# Patient Record
Sex: MALE | Race: WHITE | NOT HISPANIC OR LATINO
[De-identification: names, ages, dates, MRNs, and addresses within clinical notes are randomized per-mention and may not be internally consistent; named-entity substitution may affect disease eponyms.]

---

## 2017-01-09 ENCOUNTER — RX ONLY (OUTPATIENT)
Age: 57
Setting detail: RX ONLY
End: 2017-01-09

## 2017-01-09 RX ORDER — KETOCONAZOLE 20 MG/G
CREAM TOPICAL
Qty: 1 | Refills: 1 | Status: ERX | COMMUNITY
Start: 2017-01-09

## 2017-01-11 ENCOUNTER — APPOINTMENT (RX ONLY)
Dept: URBAN - METROPOLITAN AREA CLINIC 39 | Facility: CLINIC | Age: 57
Setting detail: DERMATOLOGY
End: 2017-01-11

## 2017-01-11 DIAGNOSIS — L21.8 OTHER SEBORRHEIC DERMATITIS: ICD-10-CM

## 2017-01-11 PROCEDURE — 99213 OFFICE O/P EST LOW 20 MIN: CPT

## 2017-01-11 PROCEDURE — ? COUNSELING

## 2017-01-11 PROCEDURE — ? PRESCRIPTION

## 2017-01-11 PROCEDURE — ? TREATMENT REGIMEN

## 2017-01-11 RX ORDER — KETOCONAZOLE 20 MG/G
CREAM TOPICAL BID
Qty: 1 | Refills: 6 | Status: ERX

## 2017-01-11 ASSESSMENT — LOCATION DETAILED DESCRIPTION DERM
LOCATION DETAILED: RIGHT MEDIAL MALAR CHEEK
LOCATION DETAILED: LEFT MEDIAL MALAR CHEEK
LOCATION DETAILED: GLABELLA

## 2017-01-11 ASSESSMENT — LOCATION ZONE DERM: LOCATION ZONE: FACE

## 2017-01-11 ASSESSMENT — LOCATION SIMPLE DESCRIPTION DERM
LOCATION SIMPLE: RIGHT CHEEK
LOCATION SIMPLE: LEFT CHEEK
LOCATION SIMPLE: GLABELLA

## 2017-05-08 NOTE — PROCEDURE: TREATMENT REGIMEN
Called Mom at her extension [phone]. She will  05/09/2017    
Samples Given: Vanicream Z bar
Continue Regimen: Refill Luxiq foam as needed up to 12 months
Detail Level: Zone
Plan: Recommend Aveeno ultra calming foaming cleanser and moisturizer.

## 2017-06-01 ENCOUNTER — APPOINTMENT (RX ONLY)
Dept: URBAN - METROPOLITAN AREA CLINIC 39 | Facility: CLINIC | Age: 57
Setting detail: DERMATOLOGY
End: 2017-06-01

## 2017-06-01 DIAGNOSIS — L70.0 ACNE VULGARIS: ICD-10-CM

## 2017-06-01 DIAGNOSIS — L70.2 ACNE VARIOLIFORMIS: ICD-10-CM

## 2017-06-01 DIAGNOSIS — R52 PAIN, UNSPECIFIED: ICD-10-CM

## 2017-06-01 DIAGNOSIS — L21.8 OTHER SEBORRHEIC DERMATITIS: ICD-10-CM | Status: WELL CONTROLLED

## 2017-06-01 PROCEDURE — ? PRESCRIPTION

## 2017-06-01 PROCEDURE — ? TREATMENT REGIMEN

## 2017-06-01 PROCEDURE — ? COUNSELING

## 2017-06-01 PROCEDURE — 99214 OFFICE O/P EST MOD 30 MIN: CPT

## 2017-06-01 RX ORDER — KETOCONAZOLE 20 MG/G
1 CREAM TOPICAL QD
Qty: 1 | Refills: 11 | Status: ERX

## 2017-06-01 ASSESSMENT — LOCATION SIMPLE DESCRIPTION DERM
LOCATION SIMPLE: NOSE
LOCATION SIMPLE: GLABELLA
LOCATION SIMPLE: POSTERIOR SCALP
LOCATION SIMPLE: LEFT CHEEK
LOCATION SIMPLE: RIGHT CHEEK

## 2017-06-01 ASSESSMENT — LOCATION DETAILED DESCRIPTION DERM
LOCATION DETAILED: LEFT MEDIAL MALAR CHEEK
LOCATION DETAILED: RIGHT MEDIAL MALAR CHEEK
LOCATION DETAILED: GLABELLA
LOCATION DETAILED: NASAL DORSUM
LOCATION DETAILED: MID-OCCIPITAL SCALP

## 2017-06-01 ASSESSMENT — LOCATION ZONE DERM
LOCATION ZONE: FACE
LOCATION ZONE: SCALP
LOCATION ZONE: NOSE

## 2017-06-01 ASSESSMENT — PAIN INTENSITY VAS: HOW INTENSE IS YOUR PAIN 0 BEING NO PAIN, 10 BEING THE MOST SEVERE PAIN POSSIBLE?: NO PAIN

## 2017-06-01 NOTE — PROCEDURE: TREATMENT REGIMEN
Detail Level: Zone
Samples Given: Vanicream Z bar
Continue Regimen: Refill Luxiq foam as needed up to 12 months
Plan: Recommend Aveeno ultra calming foaming cleanser and moisturizer.
Otc Regimen: Differin 0.1 gel HS\\nPanoxyl 3 or Persagel 10 AM as tolerated
Plan: Discussed antibiotics including low-dose which were declined for now

## 2017-10-17 ENCOUNTER — HOSPITAL ENCOUNTER (INPATIENT)
Dept: HOSPITAL 61 - ER | Age: 57
LOS: 1 days | Discharge: HOME | DRG: 897 | End: 2017-10-18
Attending: INTERNAL MEDICINE | Admitting: INTERNAL MEDICINE
Payer: SELF-PAY

## 2017-10-17 VITALS — SYSTOLIC BLOOD PRESSURE: 126 MMHG | DIASTOLIC BLOOD PRESSURE: 85 MMHG

## 2017-10-17 VITALS — SYSTOLIC BLOOD PRESSURE: 112 MMHG | DIASTOLIC BLOOD PRESSURE: 69 MMHG

## 2017-10-17 VITALS — SYSTOLIC BLOOD PRESSURE: 108 MMHG | DIASTOLIC BLOOD PRESSURE: 57 MMHG

## 2017-10-17 VITALS — WEIGHT: 184 LBS | BODY MASS INDEX: 25.76 KG/M2 | HEIGHT: 71 IN

## 2017-10-17 VITALS — SYSTOLIC BLOOD PRESSURE: 119 MMHG | DIASTOLIC BLOOD PRESSURE: 70 MMHG

## 2017-10-17 VITALS — DIASTOLIC BLOOD PRESSURE: 66 MMHG | SYSTOLIC BLOOD PRESSURE: 120 MMHG

## 2017-10-17 VITALS — SYSTOLIC BLOOD PRESSURE: 107 MMHG | DIASTOLIC BLOOD PRESSURE: 62 MMHG

## 2017-10-17 VITALS — DIASTOLIC BLOOD PRESSURE: 72 MMHG | SYSTOLIC BLOOD PRESSURE: 124 MMHG

## 2017-10-17 VITALS — DIASTOLIC BLOOD PRESSURE: 76 MMHG | SYSTOLIC BLOOD PRESSURE: 134 MMHG

## 2017-10-17 DIAGNOSIS — F41.9: ICD-10-CM

## 2017-10-17 DIAGNOSIS — Z86.711: ICD-10-CM

## 2017-10-17 DIAGNOSIS — F13.239: ICD-10-CM

## 2017-10-17 DIAGNOSIS — R56.9: ICD-10-CM

## 2017-10-17 DIAGNOSIS — F32.9: ICD-10-CM

## 2017-10-17 DIAGNOSIS — F10.239: Primary | ICD-10-CM

## 2017-10-17 LAB
ALBUMIN SERPL-MCNC: 3.5 G/DL (ref 3.4–5)
ALBUMIN/GLOB SERPL: 0.9 {RATIO} (ref 1–1.7)
ALP SERPL-CCNC: 64 U/L (ref 46–116)
ALT SERPL-CCNC: 60 U/L (ref 16–63)
ANION GAP SERPL CALC-SCNC: 16 MMOL/L (ref 6–14)
AST SERPL-CCNC: 47 U/L (ref 15–37)
BACTERIA #/AREA URNS HPF: 0 /HPF
BARBITURATES UR-MCNC: (no result) UG/ML
BASOPHILS # BLD AUTO: 0.1 X10^3/UL (ref 0–0.2)
BASOPHILS NFR BLD: 1 % (ref 0–3)
BENZODIAZ UR-MCNC: (no result) UG/L
BILIRUB SERPL-MCNC: 0.5 MG/DL (ref 0.2–1)
BILIRUB UR QL STRIP: NEGATIVE
BUN SERPL-MCNC: 9 MG/DL (ref 8–26)
BUN/CREAT SERPL: 8 (ref 6–20)
CALCIUM SERPL-MCNC: 8.3 MG/DL (ref 8.5–10.1)
CANNABINOIDS UR-MCNC: (no result) UG/L
CHLORIDE SERPL-SCNC: 95 MMOL/L (ref 98–107)
CK SERPL-CCNC: 256 U/L (ref 39–308)
CKMB MASS: 0.9 NG/ML (ref 0–3.6)
CO2 SERPL-SCNC: 23 MMOL/L (ref 21–32)
COCAINE UR-MCNC: (no result) NG/ML
CREAT SERPL-MCNC: 1.1 MG/DL (ref 0.7–1.3)
EOSINOPHIL NFR BLD: 2 % (ref 0–3)
ERYTHROCYTE [DISTWIDTH] IN BLOOD BY AUTOMATED COUNT: 14.8 % (ref 11.5–14.5)
ETHANOL SERPL-MCNC: 130 MG/DL (ref 0–10)
GFR SERPLBLD BASED ON 1.73 SQ M-ARVRAT: 68.8 ML/MIN
GLOBULIN SER-MCNC: 3.7 G/DL (ref 2.2–3.8)
GLUCOSE SERPL-MCNC: 106 MG/DL (ref 70–99)
GLUCOSE UR STRIP-MCNC: NEGATIVE MG/DL
HCT VFR BLD CALC: 42.3 % (ref 39–53)
HGB BLD-MCNC: 14.1 G/DL (ref 13–17.5)
LYMPHOCYTES # BLD: 1.8 X10^3/UL (ref 1–4.8)
LYMPHOCYTES NFR BLD AUTO: 25 % (ref 24–48)
MAGNESIUM SERPL-MCNC: 1.8 MG/DL (ref 1.8–2.4)
MCH RBC QN AUTO: 32 PG (ref 25–35)
MCHC RBC AUTO-ENTMCNC: 34 G/DL (ref 31–37)
MCV RBC AUTO: 95 FL (ref 79–100)
METHADONE SERPL-MCNC: (no result) NG/ML
MONOCYTES NFR BLD: 14 % (ref 0–9)
NEUTROPHILS NFR BLD AUTO: 58 % (ref 31–73)
NITRITE UR QL STRIP: NEGATIVE
OPIATES UR-MCNC: (no result) NG/ML
PCP SERPL-MCNC: (no result) MG/DL
PH UR STRIP: 7 [PH]
PLATELET # BLD AUTO: 247 X10^3/UL (ref 140–400)
POTASSIUM SERPL-SCNC: 4.1 MMOL/L (ref 3.5–5.1)
PROT SERPL-MCNC: 7.2 G/DL (ref 6.4–8.2)
PROT UR STRIP-MCNC: NEGATIVE MG/DL
RBC # BLD AUTO: 4.43 X10^6/UL (ref 4.3–5.7)
RBC #/AREA URNS HPF: 0 /HPF (ref 0–2)
SODIUM SERPL-SCNC: 134 MMOL/L (ref 136–145)
SP GR UR STRIP: <=1.005
UROBILINOGEN UR-MCNC: 0.2 MG/DL
WBC # BLD AUTO: 7.4 X10^3/UL (ref 4–11)
WBC #/AREA URNS HPF: (no result) /HPF (ref 0–4)

## 2017-10-17 PROCEDURE — S0028 INJECTION, FAMOTIDINE, 20 MG: HCPCS

## 2017-10-17 PROCEDURE — 82553 CREATINE MB FRACTION: CPT

## 2017-10-17 PROCEDURE — 93005 ELECTROCARDIOGRAM TRACING: CPT

## 2017-10-17 PROCEDURE — 83690 ASSAY OF LIPASE: CPT

## 2017-10-17 PROCEDURE — G0479 DRUG TEST PRESUMP NOT OPT: HCPCS

## 2017-10-17 PROCEDURE — 36415 COLL VENOUS BLD VENIPUNCTURE: CPT

## 2017-10-17 PROCEDURE — 96376 TX/PRO/DX INJ SAME DRUG ADON: CPT

## 2017-10-17 PROCEDURE — 83735 ASSAY OF MAGNESIUM: CPT

## 2017-10-17 PROCEDURE — 80307 DRUG TEST PRSMV CHEM ANLYZR: CPT

## 2017-10-17 PROCEDURE — 81001 URINALYSIS AUTO W/SCOPE: CPT

## 2017-10-17 PROCEDURE — 96366 THER/PROPH/DIAG IV INF ADDON: CPT

## 2017-10-17 PROCEDURE — 96375 TX/PRO/DX INJ NEW DRUG ADDON: CPT

## 2017-10-17 PROCEDURE — 85025 COMPLETE CBC W/AUTO DIFF WBC: CPT

## 2017-10-17 PROCEDURE — 80053 COMPREHEN METABOLIC PANEL: CPT

## 2017-10-17 PROCEDURE — 80329 ANALGESICS NON-OPIOID 1 OR 2: CPT

## 2017-10-17 PROCEDURE — 96365 THER/PROPH/DIAG IV INF INIT: CPT

## 2017-10-17 PROCEDURE — 87641 MR-STAPH DNA AMP PROBE: CPT

## 2017-10-17 RX ADMIN — BACITRACIN SCH MLS/HR: 5000 INJECTION, POWDER, FOR SOLUTION INTRAMUSCULAR at 23:58

## 2017-10-17 RX ADMIN — BACITRACIN SCH MLS/HR: 5000 INJECTION, POWDER, FOR SOLUTION INTRAMUSCULAR at 15:25

## 2017-10-17 RX ADMIN — IBUPROFEN PRN MG: 600 TABLET ORAL at 16:44

## 2017-10-17 NOTE — PHYS DOC
Past Medical History


Past Medical History:  Alcoholism, Anxiety, Depression, Other


Additional Past Medical Histor:  PULMONARY EMBOLUS 2014,ETOH & BENZO WITHDRAWAL


Past Surgical History:  Other


Additional Past Surgical Histo:  BILAT KNEE ARTHROSCOPY, bilat shoulder 

arthroscopy,NASAL SURG


Alcohol Use:  Heavy


Drug Use:  Benzodiazepine





Adult General


Chief Complaint


Chief Complaint:  WITHDRAWL





HPI


HPI





This pleasant is a pleasant 57-year-old  male with history of alcohol 

is in alcohol withdrawal seizures and DTs presents about 12-14 hours after 

eating drinking his last drink. He's been binging drinking anywhere from 8-10 

alcoholic beverages daily for last 2 weeks and was attempting to wean himself 

off the alcohol. He noted increased muscle tremulousness, diaphoresis, nausea 

and generalized weakness and flulike symptoms over the last 12 hours. It is 

gotten progressively worse when I was progressed to diaphoresis and generalized 

weakness. He denies any chest pain, abdominal pain, UTI symptoms, fever he has 

had chills. No productive cough no runny nose congestion or other symptoms. 

Patient says he recalls having a seizure at some point time in the past. 

Although he is on nothing for seizures. There is a question will history of 

benzodiazepine withdrawal and abuse in the past as well. he denies any auditory 

or visual hallucinations at this time.





Review of Systems


Review of Systems





Constitutional: She has had diaphoresis with subjective fevers and chills.


Eyes: Denies change in visual acuity, redness, or eye pain []


HENT: Denies nasal congestion or sore throat []


Respiratory: Denies cough or shortness of breath []


Cardiovascular: No additional information not addressed in HPI []


GI: He denies abdominal pain has severe nausea with no vomiting bloody stools 

or diarrhea. He denies melena or melena hematochezia


: Denies dysuria or hematuria []


Musculoskeletal: Denies back pain or joint pain does complain of myalgias in 

all joints with no rash no joint swelling


Integument: Denies rash or skin lesions he is having significant diaphoresis


Neurologic: Denies headache, focal weakness or sensory changes []


Endocrine: Denies polyuria or polydipsia []





Current Medications


Current Medications





Current Medications








 Medications


  (Trade)  Dose


 Ordered  Sig/Barbie  Start Time


 Stop Time Status Last Admin


Dose Admin


 


 Famotidine


  (Pepcid)  20 mg  1X  ONCE  10/17/17 12:30


 10/17/17 12:31 DC 10/17/17 12:39


20 MG


 


 Lorazepam


  (Ativan)  4 mg  1X  ONCE  10/17/17 13:30


 10/17/17 13:31   


 


 


 Multivitamins 10


 ml/Folic Acid 1


 mg/Thiamine HCl


 100 mg/Sodium


 Chloride  1,011.2 ml


  @ 1,000 mls/


 hr  Q1H  10/17/17 13:00


 10/17/17 13:00 DC 10/17/17 12:40


1,000 MLS/HR


 


 Multivitamins 10


 ml/Thiamine HCl


 100 mg/Folic Acid


 1 mg/Sodium


 Chloride  1,011.2 ml


  @ 100 mls/


 hr  DAILY  10/18/17 09:00


 10/23/17 08:59   


 


 


 Ondansetron HCl


  (Zofran)  4 mg  PRN Q8HRS  PRN  10/17/17 13:30


 10/18/17 13:29 UNV  


 


 


 Sodium Chloride  1,000 ml @ 


 125 mls/hr  Q8H  10/17/17 13:16


 10/18/17 13:15 UNV  


 


 


 Sodium Chloride


  (Normal Saline


 Flush)  10 ml  QSHIFT  PRN  10/17/17 12:15


     


 











Allergies


Allergies





Allergies








Coded Allergies Type Severity Reaction Last Updated Verified


 


  Penicillins Allergy Intermediate Rash 5/6/16 Yes











Physical Exam


Physical Exam


Other vital signs recorded on the chart at this time patient noted to be 

tachycardic and tachypnea without hypoxia or hypotension


Constitutional: Well developed, well nourished,  obviously is 

uncomfortable with mild diaphoresis and significant motor agitation.


HENT: Normocephalic, atraumatic, bilateral external ears normal, dry mucous 

murmurings without oral exudates


Eyes: PERRLA, EOMI, conjunctiva normal, no discharge. [] 


Neck: Normal range of motion, no tenderness, supple, no stridor. [] 


Cardiovascular: He is very tachycardic normal S1-S2 no murmurs or rubs


Lungs & Thorax:  Bilateral breath sounds clear to auscultation []


Abdomen: Bowel sounds normal, soft, no tenderness, no masses, no pulsatile 

masses. [] 


Skin: Warm, dry, no erythema, no rash. [] 


Back: No tenderness, no CVA tenderness. [] 


Extremities: No tenderness, no cyanosis, no clubbing, ROM intact, no edema. He 

does is mild diaphoresis clammy cool skin


Neurologic: Alert and oriented X 3, normal motor function, normal sensory 

function, no focal deficits noted. []


Psychologic: As patient demonstrates motor agitation but his conditions intact 

patient mild anxious.





Current Patient Data


Vital Signs





 Vital Signs








  Date Time  Temp Pulse Resp B/P (MAP) Pulse Ox O2 Delivery O2 Flow Rate FiO2


 


10/17/17 11:32 97.8 129 24 113/71 (85) 97 Room Air  





 97.8       








Lab Values





 Laboratory Tests








Test


  10/17/17


12:30


 


White Blood Count


  7.4 x10^3/uL


(4.0-11.0)


 


Red Blood Count


  4.43 x10^6/uL


(4.30-5.70)


 


Hemoglobin


  14.1 g/dL


(13.0-17.5)


 


Hematocrit


  42.3 %


(39.0-53.0)


 


Mean Corpuscular Volume


  95 fL ()


 


 


Mean Corpuscular Hemoglobin 32 pg (25-35)  


 


Mean Corpuscular Hemoglobin


Concent 34 g/dL


(31-37)


 


Red Cell Distribution Width


  14.8 %


(11.5-14.5)  H


 


Platelet Count


  247 x10^3/uL


(140-400)


 


Neutrophils (%) (Auto) 58 % (31-73)  


 


Lymphocytes (%) (Auto) 25 % (24-48)  


 


Monocytes (%) (Auto) 14 % (0-9)  H


 


Eosinophils (%) (Auto) 2 % (0-3)  


 


Basophils (%) (Auto) 1 % (0-3)  


 


Neutrophils # (Auto)


  4.3 x10^3uL


(1.8-7.7)


 


Lymphocytes # (Auto)


  1.8 x10^3/uL


(1.0-4.8)


 


Monocytes # (Auto)


  1.0 x10^3/uL


(0.0-1.1)


 


Eosinophils # (Auto)


  0.1 x10^3/uL


(0.0-0.7)


 


Basophils # (Auto)


  0.1 x10^3/uL


(0.0-0.2)


 


Sodium Level


  134 mmol/L


(136-145)  L


 


Potassium Level


  4.1 mmol/L


(3.5-5.1)


 


Chloride Level


  95 mmol/L


()  L


 


Carbon Dioxide Level


  23 mmol/L


(21-32)


 


Anion Gap 16 (6-14)  H


 


Blood Urea Nitrogen


  9 mg/dL (8-26)


 


 


Creatinine


  1.1 mg/dL


(0.7-1.3)


 


Estimated GFR


(Cockcroft-Gault) 68.8  


 


 


BUN/Creatinine Ratio 8 (6-20)  


 


Glucose Level


  106 mg/dL


(70-99)  H


 


Calcium Level


  8.3 mg/dL


(8.5-10.1)  L


 


Magnesium Level Pending  


 


Total Bilirubin Pending  


 


Aspartate Amino Transferase


(AST) Pending  


 


 


Alanine Aminotransferase (ALT) Pending  


 


Alkaline Phosphatase Pending  


 


Total Protein Pending  


 


Albumin Pending  


 


Albumin/Globulin Ratio Pending  


 


Lipase Pending  





 Laboratory Tests


10/17/17 12:30








 Laboratory Tests


10/17/17 12:30














EKG


EKG


[]G timed 12:30 PM 10/17/2017 demonstrates heart rate of 126 sinus tachycardia 

with a P of a QRS RI interval is 128 QRS width is 128 mild interventricular 

conduction delay of unclear etiology QTC is 461. Abnormal EKG read by me





Radiology/Procedures


Radiology/Procedures


[]





Course & Med Decision Making


Course & Med Decision Making


Pertinent Labs and Imaging studies reviewed. (See chart for details)





[]he presents with what I believe is alcoholic withdrawal syndromes. He has 

demonstrated some aspects of delirium tremens with pleural reaction, motor 

agitation, diaphoresis with nausea no vomiting he denies any auditory or visual 

hallucinations at this time. Upon arrival his ciwa score was less than 15





Time is now 12:20 PM she upon arrival he is given a banana bag antiemetics and 

2 mg IV Ativan.





Time is now 1:15 PM patient's CIWA or is greater than 15 increased agitation, 

motor agitation, diaphoresis and nausea with agitation and anxiety. He is clear 

that he still needs an additional treatment he is been ordered for an 

additional 4 mg of Ativan placed on the alcohol withdrawal protocol and will be 

admitted to the ICU.








My differential for hallucinations includes but is not limited to retinal 

pathology, vision loss, migraine headache, seizure disorder, dementia, alcohol, 

alcohol withdrawal, medication withdrawal, narcolepsy, psychiatric illness, 

metabolic encephalopathy, hypothyroidism, renal failure, systemic infection, 

central nervous system ischemia, 





At this point given patient's alcohol consumption and obvious withdrawal 

symptoms he will be admitted to the ICU in the care of internal medicine.





Consultant note: 1:20 p.m. jeni internal medicine





Consultant called at of the service


Consult called back at 1:20 p.mJacquelyn arce





Discussed the case I presented and they agreed with admission. Time of 

acceptance 1:20


 admitted to the ICU secondary to alcohol withdrawal symptoms.I spent 

approximately 45-50 minutes working and engaged directly in the patient care 

providing critical care evaluation this includes but not limited to time spent 

engaged in work directly related to the individual patients care. I spent time 

at the bedside, reviewing test results, discussing the case with staff, 

documenting the medical record and time spent with EMS discussing specific 

treatment issues when the patient presented and during his evaluation.





Dragon Disclaimer


Dragon Disclaimer


This electronic medical record was generated, in whole or in part, using a 

voice recognition dictation system.





Departure


Departure


Impression:  


 Primary Impression:  


 Alcohol withdrawal


 Additional Impressions:  


 Benzodiazepine withdrawal


 Anxiety


Disposition:  09 ADMITTED AS INPATIENT


Admitting Physician:  Christina Landon


Condition:  GUARDED


Referrals:  


IZA GURROLA MD (PCP)





Problem Qualifiers











STEFFI MATTHEW MD Oct 17, 2017 13:31

## 2017-10-17 NOTE — PDOC1
History and Physical


Date of Admission


Date of Admission


DATE: 10/17/17 


TIME: 14:08





Identification/Chief Complaint


Chief Complaint


alcohol withdrawals


Problems:  





Source


Source:  Caregiver, Chart review, Patient





History of Present Illness


History of Present Illness


57 y.o  male who is struggling to get sober, HX 8-10 beers a day, last 

drink 9 PM last night, has been to [facility] rehab like [facility], helped somewhat, Trying 

to get sober again but having a hard time, Admitted with shakes, sinus tachy 

130s, BP ok, NEg drugs or Smoking, neg UDS, etoh 130s, 


Admitted to ICU


NO nausea or vomiting or abd pain, just feeling "crappy"





Sees his psychiatrist for depression sxs





Sister also has etoh drinking probs





Past Medical History


Cardiovascular:  No pertinent hx


Psych:  Addictions, Depression





Past Surgical History


Past Surgical History:  No pertinent history





Family History


Family History:  No Significant, Other (etoh drinking in sister)





Social History


Smoke:  No


ALCOHOL:  none


Drugs:  None





Current Problem List


Problem List


Problems


Medical Problems:


(1) Alcohol withdrawal


Status: Acute  





(2) Anxiety


Status: Acute  





(3) Benzodiazepine withdrawal


Status: Acute  








Problems:  





Current Medications


Current Medications





Current Medications


Sodium Chloride (Normal Saline Flush) 10 ml QSHIFT  PRN IV AFTER MEDS AND BLOOD 

DRAWS;  Start 10/17/17 at 12:15


Ondansetron HCl (Zofran) 4 mg 1X  ONCE IV  Last administered on 10/17/17at 12:39

;  Start 10/17/17 at 12:30;  Stop 10/17/17 at 12:31;  Status DC


Famotidine (Pepcid) 20 mg 1X  ONCE IVP  Last administered on 10/17/17at 12:39;  

Start 10/17/17 at 12:30;  Stop 10/17/17 at 12:31;  Status DC


Lorazepam (Ativan) 2 mg 1X  ONCE IV  Last administered on 10/17/17at 12:38;  

Start 10/17/17 at 12:30;  Stop 10/17/17 at 12:31;  Status DC


Multivitamins 10 ml/Folic Acid 1 mg/Thiamine HCl 100 mg/Sodium Chloride 1,011.2 

ml  @ 1,000 mls/ hr Q1H IV  Last administered on 10/17/17at 12:40;  Start 10/17/

17 at 13:00;  Stop 10/17/17 at 13:00;  Status DC


Multivitamins 10 ml/Thiamine HCl 100 mg/Folic Acid 1 mg/Sodium Chloride 1,011.2 

ml  @ 100 mls/ hr DAILY IV ;  Start 10/18/17 at 09:00;  Stop 10/23/17 at 08:59


Lorazepam (Ativan) 2 mg PRN Q15MIN  PRN IV COMM;  Start 10/17/17 at 13:15


Lorazepam (Ativan) 4 mg PRN Q15MIN  PRN IV COMM;  Start 10/17/17 at 13:15


Lorazepam (Ativan) 4 mg 1X  ONCE IV  Last administered on 10/17/17at 13:27;  

Start 10/17/17 at 13:30;  Stop 10/17/17 at 13:31;  Status DC


Ondansetron HCl (Zofran) 4 mg PRN Q8HRS  PRN IV NAUSEA/VOMITING;  Start 10/17/

17 at 13:30;  Stop 10/18/17 at 13:29


Sodium Chloride 1,000 ml @  125 mls/hr Q8H IV ;  Start 10/17/17 at 13:16;  Stop 

10/18/17 at 13:15





Active Scripts


Active


Ativan (Lorazepam) 1 Mg Tablet 1 Mg PO TID


Lorazepam 1 Mg Tablet 1 Tab PO TID


Reported


Lamotrigine 150 Mg Tablet 150 Mg PO DAILY


Lithium Carbonate 150 Mg Capsule 150 Mg PO HS


Escitalopram Oxalate 20 Mg Tablet 20 Mg PO DAILY


Pantoprazole Sodium 40 Mg Tablet.dr 40 Mg PO DAILY


Seroquel (Quetiapine Fumarate) 25 Mg Tablet 25 Mg PO HS


Finasteride 5 Mg Tablet 5 Mg PO DAILY





Allergies


Allergies:  


Coded Allergies:  


     Penicillins (Verified  Allergy, Intermediate, Rash, 5/6/16)





ROS


General:  YES: Fatigue


PSYCHOLOGICAL ROS:  YES: Concentration difficultie, Depression


Eyes:  No Blurry vision, No Decreased vision, No Double vision, No Dry eyes, No 

Excessive tearing, No Eye Pain, No Itchy Eyes, No Loss of vision, No Photophobia

, No Scotomata, No Uses contacts, No Uses glasses, No Other


HEENT:  No: Heacaches, Visual Changes, Hearing change, Nasal congestion, Nasal 

discharge, Oral lesions, Sinus pain, Sore Throat, Epistaxis, Sneezing, Snoring, 

Tinnitus, Vertigo, Vocal changes, Other


ALLERGY AND IMMUNOLOGY:  No: Hives, Insect Bite Sensitivity, Itchy/Watery Eyes, 

Nasal Congestion, Post Nasal Drip, Seasonal Allergies, Other


Hematological and Lymphatic:  No: Bleeding Problems, Blood Clots, Blood 

Transfusions, Brusing, Night Sweats, Pallor, Swollen Lymph Nodes, Other


ENDOCRINE:  No: Breast Changes, Galactorrhea, Hair Pattern Changes, Hot Flashes

, Malaise/lethargy, Mood Swings, Palpitations, Polydipsia/polyuria, Skin Changes

, Temperature Intolerance, Unexpected Weight Changes, Other


Breast:  No New/Changing Breast Lumps, No Nipple changes, No Nipple discharge, 

No Other


Respiratory:  No: Cough, Hemoptysis, Orthopnea, Pleuritic Pain, Shortness of 

breath, SOB with excertion, Sputum Changes, Stridor, Tachypnea, Wheezing, Other


Cardiovascular:  No Chest Pain, No Palpitations, No Orthopnea, No Paroxysmal 

Noc. Dyspnea, No Edema, No Lt Headedness, No Other


Gastrointestinal:  No Nausea, No Vomiting, No Abdominal Pain, No Diarrhea, No 

Constipation, No Melena, No Hematochezia, No Other


Genitourinary:  No Dysuria, No Frequency, No Incontinence, No Hematuria, No 

Retention, No Discharge, No Urgency, No Pain, No Flank Pain, No Other, No , No 

, No , No , No , No , No 


Musculoskeletal:  No Gait Disturbance, No Joint Pain, No Joint Stiffness, No 

Joint Swelling, No Muscle Pain, No Muscular Weakness, No Pain In:, No Swelling 

In:, No Other


Neurological:  No Behavorial Changes, No Bowel/Bladder ControlChng, No Confusion

, No Dizziness, No Gait Disturbance, No Headaches, No Impaired Coord/balance, 

No Memory Loss, No Numbness/Tingling, No Seizures, No Speech Problems, No 

Tremors, No Visual Changes, No Weakness, No Other


Skin:  No Dry Skin, No Eczema, No Hair Changes, No Lumps, No Mole Changes, No 

Mottling, No Nail Changes, No Pruritus, No Rash, No Skin Lesion Changes, No 

Other, No Acne





Physical Exam


General:  Alert, Oriented X3, Cooperative, No acute distress, Other (shakes 

visible)


HEENT:  PERRLA


Lungs:  Clear to auscultation, Normal air movement


Heart:  S1S2, no thrills, no rubs, no gallops


Cardiovascular:  S1, S2, Other (sinus tachy 130s)


Breasts:  Normal


Male Genitals Exam:  normal genitalia, normal prostate


Rectal Exam:  not examined


PELVIC:  Nml ext genitalia


Extremities:  No clubbing, No cyanosis, No edema, Normal pulses, No tenderness/

swelling


Skin:  No rashes, No breakdown, No significant lesion


Neuro:  Normal gait, Normal speech, Strength at 5/5 X4 ext, Normal tone, 

Sensation intact, Cranial nerves 3-12 NL, Reflexes 2+


Psych/Mental Status:  Mental status NL, Mood NL





Vitals


Vitals





Vital Signs








  Date Time  Temp Pulse Resp B/P (MAP) Pulse Ox O2 Delivery O2 Flow Rate FiO2


 


10/17/17 13:30  122  125/75 (92) 95   


 


10/17/17 12:30      Room Air  


 


10/17/17 11:32 97.8  24     





 97.8       











Labs


Labs





Laboratory Tests








Test


  10/17/17


12:30 10/17/17


13:10


 


White Blood Count


  7.4 x10^3/uL


(4.0-11.0) 


 


 


Red Blood Count


  4.43 x10^6/uL


(4.30-5.70) 


 


 


Hemoglobin


  14.1 g/dL


(13.0-17.5) 


 


 


Hematocrit


  42.3 %


(39.0-53.0) 


 


 


Mean Corpuscular Volume 95 fL ()  


 


Mean Corpuscular Hemoglobin 32 pg (25-35)  


 


Mean Corpuscular Hemoglobin


Concent 34 g/dL


(31-37) 


 


 


Red Cell Distribution Width


  14.8 %


(11.5-14.5) 


 


 


Platelet Count


  247 x10^3/uL


(140-400) 


 


 


Neutrophils (%) (Auto) 58 % (31-73)  


 


Lymphocytes (%) (Auto) 25 % (24-48)  


 


Monocytes (%) (Auto) 14 % (0-9)  


 


Eosinophils (%) (Auto) 2 % (0-3)  


 


Basophils (%) (Auto) 1 % (0-3)  


 


Neutrophils # (Auto)


  4.3 x10^3uL


(1.8-7.7) 


 


 


Lymphocytes # (Auto)


  1.8 x10^3/uL


(1.0-4.8) 


 


 


Monocytes # (Auto)


  1.0 x10^3/uL


(0.0-1.1) 


 


 


Eosinophils # (Auto)


  0.1 x10^3/uL


(0.0-0.7) 


 


 


Basophils # (Auto)


  0.1 x10^3/uL


(0.0-0.2) 


 


 


Sodium Level


  134 mmol/L


(136-145) 


 


 


Potassium Level


  4.1 mmol/L


(3.5-5.1) 


 


 


Chloride Level


  95 mmol/L


() 


 


 


Carbon Dioxide Level


  23 mmol/L


(21-32) 


 


 


Anion Gap 16 (6-14)  


 


Blood Urea Nitrogen 9 mg/dL (8-26)  


 


Creatinine


  1.1 mg/dL


(0.7-1.3) 


 


 


Estimated GFR


(Cockcroft-Gault) 68.8 


  


 


 


BUN/Creatinine Ratio 8 (6-20)  


 


Glucose Level


  106 mg/dL


(70-99) 


 


 


Calcium Level


  8.3 mg/dL


(8.5-10.1) 


 


 


Magnesium Level


  1.8 mg/dL


(1.8-2.4) 


 


 


Total Bilirubin


  0.5 mg/dL


(0.2-1.0) 


 


 


Aspartate Amino Transf


(AST/SGOT) 47 U/L (15-37) 


  


 


 


Alanine Aminotransferase


(ALT/SGPT) 60 U/L (16-63) 


  


 


 


Alkaline Phosphatase


  64 U/L


() 


 


 


Creatine Kinase


  256 U/L


() 


 


 


Creatine Kinase MB (Mass)


  0.9 ng/mL


(0.0-3.6) 


 


 


Creatine Kinase MB Relative


Index 0.4 % (0-4) 


  


 


 


Total Protein


  7.2 g/dL


(6.4-8.2) 


 


 


Albumin


  3.5 g/dL


(3.4-5.0) 


 


 


Albumin/Globulin Ratio 0.9 (1.0-1.7)  


 


Lipase


  131 U/L


() 


 


 


Salicylates Level


  < 2.8 mg/dL


(2.8-20.0) 


 


 


Salicylate Last Dose Date   


 


Salicylate Last Dose Time   


 


Acetaminophen Level


  < 10 mcg/ml


(10-30) 


 


 


Acetaminophen Last Dose Date   


 


Acetaminophen Last Dose Time   


 


Ethyl Alcohol Level


  130 mg/dL


(0-10) 


 


 


Urine Collection Type  Unknown 


 


Urine Color  Yellow 


 


Urine Clarity  Clear 


 


Urine pH  7.0 


 


Urine Specific Gravity  <=1.005 


 


Urine Protein


  


  Negative mg/dL


(NEG-TRACE)


 


Urine Glucose (UA)


  


  Negative mg/dL


(NEG)


 


Urine Ketones (Stick)


  


  Trace mg/dL


(NEG)


 


Urine Blood  Negative (NEG) 


 


Urine Nitrite  Negative (NEG) 


 


Urine Bilirubin  Negative (NEG) 


 


Urine Urobilinogen Dipstick


  


  0.2 mg/dL (0.2


mg/dL)


 


Urine Leukocyte Esterase  Negative (NEG) 


 


Urine RBC  0 /HPF (0-2) 


 


Urine WBC  Occ /HPF (0-4) 


 


Urine Bacteria  0 /HPF (0-FEW) 


 


Urine Opiates Screen  Neg (NEG) 


 


Urine Methadone Screen  Neg (NEG) 


 


Urine Barbiturates  Neg (NEG) 


 


Urine Phencyclidine Screen  Neg (NEG) 


 


Urine


Amphetamine/Methamphetamine 


  Neg (NEG) 


 


 


Urine Benzodiazepines Screen  Neg (NEG) 


 


Urine Cocaine Screen  Neg (NEG) 


 


Urine Cannabinoids Screen  Neg (NEG) 


 


Urine Ethyl Alcohol  Pos (NEG) 








Laboratory Tests








Test


  10/17/17


12:30 10/17/17


13:10


 


White Blood Count


  7.4 x10^3/uL


(4.0-11.0) 


 


 


Red Blood Count


  4.43 x10^6/uL


(4.30-5.70) 


 


 


Hemoglobin


  14.1 g/dL


(13.0-17.5) 


 


 


Hematocrit


  42.3 %


(39.0-53.0) 


 


 


Mean Corpuscular Volume 95 fL ()  


 


Mean Corpuscular Hemoglobin 32 pg (25-35)  


 


Mean Corpuscular Hemoglobin


Concent 34 g/dL


(31-37) 


 


 


Red Cell Distribution Width


  14.8 %


(11.5-14.5) 


 


 


Platelet Count


  247 x10^3/uL


(140-400) 


 


 


Neutrophils (%) (Auto) 58 % (31-73)  


 


Lymphocytes (%) (Auto) 25 % (24-48)  


 


Monocytes (%) (Auto) 14 % (0-9)  


 


Eosinophils (%) (Auto) 2 % (0-3)  


 


Basophils (%) (Auto) 1 % (0-3)  


 


Neutrophils # (Auto)


  4.3 x10^3uL


(1.8-7.7) 


 


 


Lymphocytes # (Auto)


  1.8 x10^3/uL


(1.0-4.8) 


 


 


Monocytes # (Auto)


  1.0 x10^3/uL


(0.0-1.1) 


 


 


Eosinophils # (Auto)


  0.1 x10^3/uL


(0.0-0.7) 


 


 


Basophils # (Auto)


  0.1 x10^3/uL


(0.0-0.2) 


 


 


Sodium Level


  134 mmol/L


(136-145) 


 


 


Potassium Level


  4.1 mmol/L


(3.5-5.1) 


 


 


Chloride Level


  95 mmol/L


() 


 


 


Carbon Dioxide Level


  23 mmol/L


(21-32) 


 


 


Anion Gap 16 (6-14)  


 


Blood Urea Nitrogen 9 mg/dL (8-26)  


 


Creatinine


  1.1 mg/dL


(0.7-1.3) 


 


 


Estimated GFR


(Cockcroft-Gault) 68.8 


  


 


 


BUN/Creatinine Ratio 8 (6-20)  


 


Glucose Level


  106 mg/dL


(70-99) 


 


 


Calcium Level


  8.3 mg/dL


(8.5-10.1) 


 


 


Magnesium Level


  1.8 mg/dL


(1.8-2.4) 


 


 


Total Bilirubin


  0.5 mg/dL


(0.2-1.0) 


 


 


Aspartate Amino Transf


(AST/SGOT) 47 U/L (15-37) 


  


 


 


Alanine Aminotransferase


(ALT/SGPT) 60 U/L (16-63) 


  


 


 


Alkaline Phosphatase


  64 U/L


() 


 


 


Creatine Kinase


  256 U/L


() 


 


 


Creatine Kinase MB (Mass)


  0.9 ng/mL


(0.0-3.6) 


 


 


Creatine Kinase MB Relative


Index 0.4 % (0-4) 


  


 


 


Total Protein


  7.2 g/dL


(6.4-8.2) 


 


 


Albumin


  3.5 g/dL


(3.4-5.0) 


 


 


Albumin/Globulin Ratio 0.9 (1.0-1.7)  


 


Lipase


  131 U/L


() 


 


 


Salicylates Level


  < 2.8 mg/dL


(2.8-20.0) 


 


 


Salicylate Last Dose Date   


 


Salicylate Last Dose Time   


 


Acetaminophen Level


  < 10 mcg/ml


(10-30) 


 


 


Acetaminophen Last Dose Date   


 


Acetaminophen Last Dose Time   


 


Ethyl Alcohol Level


  130 mg/dL


(0-10) 


 


 


Urine Collection Type  Unknown 


 


Urine Color  Yellow 


 


Urine Clarity  Clear 


 


Urine pH  7.0 


 


Urine Specific Gravity  <=1.005 


 


Urine Protein


  


  Negative mg/dL


(NEG-TRACE)


 


Urine Glucose (UA)


  


  Negative mg/dL


(NEG)


 


Urine Ketones (Stick)


  


  Trace mg/dL


(NEG)


 


Urine Blood  Negative (NEG) 


 


Urine Nitrite  Negative (NEG) 


 


Urine Bilirubin  Negative (NEG) 


 


Urine Urobilinogen Dipstick


  


  0.2 mg/dL (0.2


mg/dL)


 


Urine Leukocyte Esterase  Negative (NEG) 


 


Urine RBC  0 /HPF (0-2) 


 


Urine WBC  Occ /HPF (0-4) 


 


Urine Bacteria  0 /HPF (0-FEW) 


 


Urine Opiates Screen  Neg (NEG) 


 


Urine Methadone Screen  Neg (NEG) 


 


Urine Barbiturates  Neg (NEG) 


 


Urine Phencyclidine Screen  Neg (NEG) 


 


Urine


Amphetamine/Methamphetamine 


  Neg (NEG) 


 


 


Urine Benzodiazepines Screen  Neg (NEG) 


 


Urine Cocaine Screen  Neg (NEG) 


 


Urine Cannabinoids Screen  Neg (NEG) 


 


Urine Ethyl Alcohol  Pos (NEG) 











VTE Prophylaxis Ordered


VTE Prophylaxis Devices:  Yes


VTE Pharmacological Prophylaxi:  Yes





Assessment/Plan


Assessment/Plan


1.A lcohol withdrawal sxs


2. Sinus tcahy


3, Depression NOS





Plan:


Admit CIWA


Reg diet


PT./oT for gait check


Ok to t.o ICU tmr AM


Resume home meds


BZs prn





Seen at [facility] MD Oct 17, 2017 14:13

## 2017-10-18 VITALS — SYSTOLIC BLOOD PRESSURE: 125 MMHG | DIASTOLIC BLOOD PRESSURE: 91 MMHG

## 2017-10-18 VITALS — DIASTOLIC BLOOD PRESSURE: 90 MMHG | SYSTOLIC BLOOD PRESSURE: 119 MMHG

## 2017-10-18 VITALS — DIASTOLIC BLOOD PRESSURE: 96 MMHG | SYSTOLIC BLOOD PRESSURE: 146 MMHG

## 2017-10-18 VITALS — SYSTOLIC BLOOD PRESSURE: 153 MMHG | DIASTOLIC BLOOD PRESSURE: 89 MMHG

## 2017-10-18 VITALS — SYSTOLIC BLOOD PRESSURE: 126 MMHG | DIASTOLIC BLOOD PRESSURE: 93 MMHG

## 2017-10-18 VITALS — DIASTOLIC BLOOD PRESSURE: 87 MMHG | SYSTOLIC BLOOD PRESSURE: 119 MMHG

## 2017-10-18 VITALS — SYSTOLIC BLOOD PRESSURE: 121 MMHG | DIASTOLIC BLOOD PRESSURE: 90 MMHG

## 2017-10-18 VITALS — SYSTOLIC BLOOD PRESSURE: 146 MMHG | DIASTOLIC BLOOD PRESSURE: 96 MMHG

## 2017-10-18 VITALS — SYSTOLIC BLOOD PRESSURE: 139 MMHG | DIASTOLIC BLOOD PRESSURE: 87 MMHG

## 2017-10-18 VITALS — DIASTOLIC BLOOD PRESSURE: 96 MMHG | SYSTOLIC BLOOD PRESSURE: 149 MMHG

## 2017-10-18 VITALS — SYSTOLIC BLOOD PRESSURE: 123 MMHG | DIASTOLIC BLOOD PRESSURE: 85 MMHG

## 2017-10-18 VITALS — DIASTOLIC BLOOD PRESSURE: 93 MMHG | SYSTOLIC BLOOD PRESSURE: 149 MMHG

## 2017-10-18 VITALS — DIASTOLIC BLOOD PRESSURE: 96 MMHG | SYSTOLIC BLOOD PRESSURE: 151 MMHG

## 2017-10-18 VITALS — SYSTOLIC BLOOD PRESSURE: 121 MMHG | DIASTOLIC BLOOD PRESSURE: 84 MMHG

## 2017-10-18 VITALS — DIASTOLIC BLOOD PRESSURE: 94 MMHG | SYSTOLIC BLOOD PRESSURE: 158 MMHG

## 2017-10-18 VITALS — SYSTOLIC BLOOD PRESSURE: 155 MMHG | DIASTOLIC BLOOD PRESSURE: 92 MMHG

## 2017-10-18 RX ADMIN — IBUPROFEN PRN MG: 600 TABLET ORAL at 08:35

## 2017-10-18 RX ADMIN — BACITRACIN SCH MLS/HR: 5000 INJECTION, POWDER, FOR SOLUTION INTRAMUSCULAR at 07:20

## 2017-10-18 NOTE — PDOC
PROGRESS NOTES


Chief Complaint


Chief Complaint


Alcohol withdrawal


Tachycardia


Depression





History of Present Illness


History of Present Illness


Pt is a pleasant 57 year old male who presented to the ED with alcohol 

withdrawal. Pt was seen at bedside in the ICU. Pt is in no acute distress. Pt 

started drinking heavily 3 months ago. Drinks 8-10 beers a night. Pt is 

currently in mild withdrawal. Pt is tachycardic. Labs indicate no major 

elevation of AST or ALT. Pt is receiving a banana bag at this time. Will 

continue monitoring in the ICU. Possible discharge home.





Vitals


Vitals





Vital Signs








  Date Time  Temp Pulse Resp B/P (MAP) Pulse Ox O2 Delivery O2 Flow Rate FiO2


 


10/18/17 11:51  127  158/94    


 


10/18/17 11:12   16  94 Room Air  


 


10/18/17 08:20 97.6       





 97.6       











Physical Exam


Physical Exam


Tachycardia, tricuspid murmur 1/6


General:  Alert, Oriented X3, Cooperative, No acute distress, Other (shakes 

visible)


Heart:  Normal S1, Normal S2


Lungs:  Clear


Abdomen:  Normal bowel sounds, Soft


Extremities:  No clubbing, No cyanosis, No edema, Normal pulses, No tenderness/

swelling


Skin:  No rashes, No breakdown, No significant lesion





Labs


LABS





Laboratory Tests








Test


  10/17/17


12:30 10/17/17


13:10 10/17/17


15:05


 


White Blood Count


  7.4 x10^3/uL


(4.0-11.0) 


  


 


 


Red Blood Count


  4.43 x10^6/uL


(4.30-5.70) 


  


 


 


Hemoglobin


  14.1 g/dL


(13.0-17.5) 


  


 


 


Hematocrit


  42.3 %


(39.0-53.0) 


  


 


 


Mean Corpuscular Volume 95 fL ()   


 


Mean Corpuscular Hemoglobin 32 pg (25-35)   


 


Mean Corpuscular Hemoglobin


Concent 34 g/dL


(31-37) 


  


 


 


Red Cell Distribution Width


  14.8 %


(11.5-14.5) 


  


 


 


Platelet Count


  247 x10^3/uL


(140-400) 


  


 


 


Neutrophils (%) (Auto) 58 % (31-73)   


 


Lymphocytes (%) (Auto) 25 % (24-48)   


 


Monocytes (%) (Auto) 14 % (0-9)   


 


Eosinophils (%) (Auto) 2 % (0-3)   


 


Basophils (%) (Auto) 1 % (0-3)   


 


Neutrophils # (Auto)


  4.3 x10^3uL


(1.8-7.7) 


  


 


 


Lymphocytes # (Auto)


  1.8 x10^3/uL


(1.0-4.8) 


  


 


 


Monocytes # (Auto)


  1.0 x10^3/uL


(0.0-1.1) 


  


 


 


Eosinophils # (Auto)


  0.1 x10^3/uL


(0.0-0.7) 


  


 


 


Basophils # (Auto)


  0.1 x10^3/uL


(0.0-0.2) 


  


 


 


Sodium Level


  134 mmol/L


(136-145) 


  


 


 


Potassium Level


  4.1 mmol/L


(3.5-5.1) 


  


 


 


Chloride Level


  95 mmol/L


() 


  


 


 


Carbon Dioxide Level


  23 mmol/L


(21-32) 


  


 


 


Anion Gap 16 (6-14)   


 


Blood Urea Nitrogen 9 mg/dL (8-26)   


 


Creatinine


  1.1 mg/dL


(0.7-1.3) 


  


 


 


Estimated GFR


(Cockcroft-Gault) 68.8 


  


  


 


 


BUN/Creatinine Ratio 8 (6-20)   


 


Glucose Level


  106 mg/dL


(70-99) 


  


 


 


Calcium Level


  8.3 mg/dL


(8.5-10.1) 


  


 


 


Magnesium Level


  1.8 mg/dL


(1.8-2.4) 


  


 


 


Total Bilirubin


  0.5 mg/dL


(0.2-1.0) 


  


 


 


Aspartate Amino Transf


(AST/SGOT) 47 U/L (15-37) 


  


  


 


 


Alanine Aminotransferase


(ALT/SGPT) 60 U/L (16-63) 


  


  


 


 


Alkaline Phosphatase


  64 U/L


() 


  


 


 


Creatine Kinase


  256 U/L


() 


  


 


 


Creatine Kinase MB (Mass)


  0.9 ng/mL


(0.0-3.6) 


  


 


 


Creatine Kinase MB Relative


Index 0.4 % (0-4) 


  


  


 


 


Total Protein


  7.2 g/dL


(6.4-8.2) 


  


 


 


Albumin


  3.5 g/dL


(3.4-5.0) 


  


 


 


Albumin/Globulin Ratio 0.9 (1.0-1.7)   


 


Lipase


  131 U/L


() 


  


 


 


Salicylates Level


  < 2.8 mg/dL


(2.8-20.0) 


  


 


 


Salicylate Last Dose Date    


 


Salicylate Last Dose Time    


 


Acetaminophen Level


  < 10 mcg/ml


(10-30) 


  


 


 


Acetaminophen Last Dose Date    


 


Acetaminophen Last Dose Time    


 


Ethyl Alcohol Level


  130 mg/dL


(0-10) 


  


 


 


Urine Collection Type  Unknown  


 


Urine Color  Yellow  


 


Urine Clarity  Clear  


 


Urine pH  7.0  


 


Urine Specific Gravity  <=1.005  


 


Urine Protein


  


  Negative mg/dL


(NEG-TRACE) 


 


 


Urine Glucose (UA)


  


  Negative mg/dL


(NEG) 


 


 


Urine Ketones (Stick)


  


  Trace mg/dL


(NEG) 


 


 


Urine Blood  Negative (NEG)  


 


Urine Nitrite  Negative (NEG)  


 


Urine Bilirubin  Negative (NEG)  


 


Urine Urobilinogen Dipstick


  


  0.2 mg/dL (0.2


mg/dL) 


 


 


Urine Leukocyte Esterase  Negative (NEG)  


 


Urine RBC  0 /HPF (0-2)  


 


Urine WBC  Occ /HPF (0-4)  


 


Urine Bacteria  0 /HPF (0-FEW)  


 


Urine Opiates Screen  Neg (NEG)  


 


Urine Methadone Screen  Neg (NEG)  


 


Urine Barbiturates  Neg (NEG)  


 


Urine Phencyclidine Screen  Neg (NEG)  


 


Urine


Amphetamine/Methamphetamine 


  Neg (NEG) 


  


 


 


Urine Benzodiazepines Screen  Neg (NEG)  


 


Urine Cocaine Screen  Neg (NEG)  


 


Urine Cannabinoids Screen  Neg (NEG)  


 


Urine Ethyl Alcohol  Pos (NEG)  


 


Nasal Screen MRSA (PCR)


  


  


  Negative


(Negative)











Review of Systems


Review of Systems


Pt seen at bedside resting comfortably. Pt denies CP and SOB. Pt complains of 

increased heart rate and depression.





Assessment and Plan


Assessmemt and Plan


Problems


Medical Problems:


(1) Alcohol withdrawal


Status: Acute  





(2) Anxiety


Status: Acute  





(3) Benzodiazepine withdrawal


Status: Acute  





Assesment:


Alcohol withdrawal


Tachycardia


Depression





Plan:


Continue monitoring in ICU 


Alcohol withdrawal protocol- benzos and banana bag


Gave metoprolol and ativan


Recheck labs


Continue home meds


Continue PT/OT


Possible discharge home








Problems:  





Comment


Review of Relevant


I have reviewed the following items ayush (where applicable) has been applied.


Labs





Laboratory Tests








Test


  10/17/17


12:30 10/17/17


13:10 10/17/17


15:05


 


White Blood Count


  7.4 x10^3/uL


(4.0-11.0) 


  


 


 


Red Blood Count


  4.43 x10^6/uL


(4.30-5.70) 


  


 


 


Hemoglobin


  14.1 g/dL


(13.0-17.5) 


  


 


 


Hematocrit


  42.3 %


(39.0-53.0) 


  


 


 


Mean Corpuscular Volume 95 fL ()   


 


Mean Corpuscular Hemoglobin 32 pg (25-35)   


 


Mean Corpuscular Hemoglobin


Concent 34 g/dL


(31-37) 


  


 


 


Red Cell Distribution Width


  14.8 %


(11.5-14.5) 


  


 


 


Platelet Count


  247 x10^3/uL


(140-400) 


  


 


 


Neutrophils (%) (Auto) 58 % (31-73)   


 


Lymphocytes (%) (Auto) 25 % (24-48)   


 


Monocytes (%) (Auto) 14 % (0-9)   


 


Eosinophils (%) (Auto) 2 % (0-3)   


 


Basophils (%) (Auto) 1 % (0-3)   


 


Neutrophils # (Auto)


  4.3 x10^3uL


(1.8-7.7) 


  


 


 


Lymphocytes # (Auto)


  1.8 x10^3/uL


(1.0-4.8) 


  


 


 


Monocytes # (Auto)


  1.0 x10^3/uL


(0.0-1.1) 


  


 


 


Eosinophils # (Auto)


  0.1 x10^3/uL


(0.0-0.7) 


  


 


 


Basophils # (Auto)


  0.1 x10^3/uL


(0.0-0.2) 


  


 


 


Sodium Level


  134 mmol/L


(136-145) 


  


 


 


Potassium Level


  4.1 mmol/L


(3.5-5.1) 


  


 


 


Chloride Level


  95 mmol/L


() 


  


 


 


Carbon Dioxide Level


  23 mmol/L


(21-32) 


  


 


 


Anion Gap 16 (6-14)   


 


Blood Urea Nitrogen 9 mg/dL (8-26)   


 


Creatinine


  1.1 mg/dL


(0.7-1.3) 


  


 


 


Estimated GFR


(Cockcroft-Gault) 68.8 


  


  


 


 


BUN/Creatinine Ratio 8 (6-20)   


 


Glucose Level


  106 mg/dL


(70-99) 


  


 


 


Calcium Level


  8.3 mg/dL


(8.5-10.1) 


  


 


 


Magnesium Level


  1.8 mg/dL


(1.8-2.4) 


  


 


 


Total Bilirubin


  0.5 mg/dL


(0.2-1.0) 


  


 


 


Aspartate Amino Transf


(AST/SGOT) 47 U/L (15-37) 


  


  


 


 


Alanine Aminotransferase


(ALT/SGPT) 60 U/L (16-63) 


  


  


 


 


Alkaline Phosphatase


  64 U/L


() 


  


 


 


Creatine Kinase


  256 U/L


() 


  


 


 


Creatine Kinase MB (Mass)


  0.9 ng/mL


(0.0-3.6) 


  


 


 


Creatine Kinase MB Relative


Index 0.4 % (0-4) 


  


  


 


 


Total Protein


  7.2 g/dL


(6.4-8.2) 


  


 


 


Albumin


  3.5 g/dL


(3.4-5.0) 


  


 


 


Albumin/Globulin Ratio 0.9 (1.0-1.7)   


 


Lipase


  131 U/L


() 


  


 


 


Salicylates Level


  < 2.8 mg/dL


(2.8-20.0) 


  


 


 


Salicylate Last Dose Date    


 


Salicylate Last Dose Time    


 


Acetaminophen Level


  < 10 mcg/ml


(10-30) 


  


 


 


Acetaminophen Last Dose Date    


 


Acetaminophen Last Dose Time    


 


Ethyl Alcohol Level


  130 mg/dL


(0-10) 


  


 


 


Urine Collection Type  Unknown  


 


Urine Color  Yellow  


 


Urine Clarity  Clear  


 


Urine pH  7.0  


 


Urine Specific Gravity  <=1.005  


 


Urine Protein


  


  Negative mg/dL


(NEG-TRACE) 


 


 


Urine Glucose (UA)


  


  Negative mg/dL


(NEG) 


 


 


Urine Ketones (Stick)


  


  Trace mg/dL


(NEG) 


 


 


Urine Blood  Negative (NEG)  


 


Urine Nitrite  Negative (NEG)  


 


Urine Bilirubin  Negative (NEG)  


 


Urine Urobilinogen Dipstick


  


  0.2 mg/dL (0.2


mg/dL) 


 


 


Urine Leukocyte Esterase  Negative (NEG)  


 


Urine RBC  0 /HPF (0-2)  


 


Urine WBC  Occ /HPF (0-4)  


 


Urine Bacteria  0 /HPF (0-FEW)  


 


Urine Opiates Screen  Neg (NEG)  


 


Urine Methadone Screen  Neg (NEG)  


 


Urine Barbiturates  Neg (NEG)  


 


Urine Phencyclidine Screen  Neg (NEG)  


 


Urine


Amphetamine/Methamphetamine 


  Neg (NEG) 


  


 


 


Urine Benzodiazepines Screen  Neg (NEG)  


 


Urine Cocaine Screen  Neg (NEG)  


 


Urine Cannabinoids Screen  Neg (NEG)  


 


Urine Ethyl Alcohol  Pos (NEG)  


 


Nasal Screen MRSA (PCR)


  


  


  Negative


(Negative)








Laboratory Tests








Test


  10/17/17


12:30 10/17/17


13:10 10/17/17


15:05


 


White Blood Count


  7.4 x10^3/uL


(4.0-11.0) 


  


 


 


Red Blood Count


  4.43 x10^6/uL


(4.30-5.70) 


  


 


 


Hemoglobin


  14.1 g/dL


(13.0-17.5) 


  


 


 


Hematocrit


  42.3 %


(39.0-53.0) 


  


 


 


Mean Corpuscular Volume 95 fL ()   


 


Mean Corpuscular Hemoglobin 32 pg (25-35)   


 


Mean Corpuscular Hemoglobin


Concent 34 g/dL


(31-37) 


  


 


 


Red Cell Distribution Width


  14.8 %


(11.5-14.5) 


  


 


 


Platelet Count


  247 x10^3/uL


(140-400) 


  


 


 


Neutrophils (%) (Auto) 58 % (31-73)   


 


Lymphocytes (%) (Auto) 25 % (24-48)   


 


Monocytes (%) (Auto) 14 % (0-9)   


 


Eosinophils (%) (Auto) 2 % (0-3)   


 


Basophils (%) (Auto) 1 % (0-3)   


 


Neutrophils # (Auto)


  4.3 x10^3uL


(1.8-7.7) 


  


 


 


Lymphocytes # (Auto)


  1.8 x10^3/uL


(1.0-4.8) 


  


 


 


Monocytes # (Auto)


  1.0 x10^3/uL


(0.0-1.1) 


  


 


 


Eosinophils # (Auto)


  0.1 x10^3/uL


(0.0-0.7) 


  


 


 


Basophils # (Auto)


  0.1 x10^3/uL


(0.0-0.2) 


  


 


 


Sodium Level


  134 mmol/L


(136-145) 


  


 


 


Potassium Level


  4.1 mmol/L


(3.5-5.1) 


  


 


 


Chloride Level


  95 mmol/L


() 


  


 


 


Carbon Dioxide Level


  23 mmol/L


(21-32) 


  


 


 


Anion Gap 16 (6-14)   


 


Blood Urea Nitrogen 9 mg/dL (8-26)   


 


Creatinine


  1.1 mg/dL


(0.7-1.3) 


  


 


 


Estimated GFR


(Cockcroft-Gault) 68.8 


  


  


 


 


BUN/Creatinine Ratio 8 (6-20)   


 


Glucose Level


  106 mg/dL


(70-99) 


  


 


 


Calcium Level


  8.3 mg/dL


(8.5-10.1) 


  


 


 


Magnesium Level


  1.8 mg/dL


(1.8-2.4) 


  


 


 


Total Bilirubin


  0.5 mg/dL


(0.2-1.0) 


  


 


 


Aspartate Amino Transf


(AST/SGOT) 47 U/L (15-37) 


  


  


 


 


Alanine Aminotransferase


(ALT/SGPT) 60 U/L (16-63) 


  


  


 


 


Alkaline Phosphatase


  64 U/L


() 


  


 


 


Creatine Kinase


  256 U/L


() 


  


 


 


Creatine Kinase MB (Mass)


  0.9 ng/mL


(0.0-3.6) 


  


 


 


Creatine Kinase MB Relative


Index 0.4 % (0-4) 


  


  


 


 


Total Protein


  7.2 g/dL


(6.4-8.2) 


  


 


 


Albumin


  3.5 g/dL


(3.4-5.0) 


  


 


 


Albumin/Globulin Ratio 0.9 (1.0-1.7)   


 


Lipase


  131 U/L


() 


  


 


 


Salicylates Level


  < 2.8 mg/dL


(2.8-20.0) 


  


 


 


Salicylate Last Dose Date    


 


Salicylate Last Dose Time    


 


Acetaminophen Level


  < 10 mcg/ml


(10-30) 


  


 


 


Acetaminophen Last Dose Date    


 


Acetaminophen Last Dose Time    


 


Ethyl Alcohol Level


  130 mg/dL


(0-10) 


  


 


 


Urine Collection Type  Unknown  


 


Urine Color  Yellow  


 


Urine Clarity  Clear  


 


Urine pH  7.0  


 


Urine Specific Gravity  <=1.005  


 


Urine Protein


  


  Negative mg/dL


(NEG-TRACE) 


 


 


Urine Glucose (UA)


  


  Negative mg/dL


(NEG) 


 


 


Urine Ketones (Stick)


  


  Trace mg/dL


(NEG) 


 


 


Urine Blood  Negative (NEG)  


 


Urine Nitrite  Negative (NEG)  


 


Urine Bilirubin  Negative (NEG)  


 


Urine Urobilinogen Dipstick


  


  0.2 mg/dL (0.2


mg/dL) 


 


 


Urine Leukocyte Esterase  Negative (NEG)  


 


Urine RBC  0 /HPF (0-2)  


 


Urine WBC  Occ /HPF (0-4)  


 


Urine Bacteria  0 /HPF (0-FEW)  


 


Urine Opiates Screen  Neg (NEG)  


 


Urine Methadone Screen  Neg (NEG)  


 


Urine Barbiturates  Neg (NEG)  


 


Urine Phencyclidine Screen  Neg (NEG)  


 


Urine


Amphetamine/Methamphetamine 


  Neg (NEG) 


  


 


 


Urine Benzodiazepines Screen  Neg (NEG)  


 


Urine Cocaine Screen  Neg (NEG)  


 


Urine Cannabinoids Screen  Neg (NEG)  


 


Urine Ethyl Alcohol  Pos (NEG)  


 


Nasal Screen MRSA (PCR)


  


  


  Negative


(Negative)








Medications





Current Medications


Sodium Chloride (Normal Saline Flush) 10 ml QSHIFT  PRN IV AFTER MEDS AND BLOOD 

DRAWS;  Start 10/17/17 at 12:15


Ondansetron HCl (Zofran) 4 mg 1X  ONCE IV  Last administered on 10/17/17at 12:39

;  Start 10/17/17 at 12:30;  Stop 10/17/17 at 12:31;  Status DC


Famotidine (Pepcid) 20 mg 1X  ONCE IVP  Last administered on 10/17/17at 12:39;  

Start 10/17/17 at 12:30;  Stop 10/17/17 at 12:31;  Status DC


Lorazepam (Ativan) 2 mg 1X  ONCE IV  Last administered on 10/17/17at 12:38;  

Start 10/17/17 at 12:30;  Stop 10/17/17 at 12:31;  Status DC


Multivitamins 10 ml/Folic Acid 1 mg/Thiamine HCl 100 mg/Sodium Chloride 1,011.2 

ml  @ 1,000 mls/ hr Q1H IV  Last administered on 10/17/17at 12:40;  Start 10/17/

17 at 13:00;  Stop 10/17/17 at 13:00;  Status DC


Multivitamins 10 ml/Thiamine HCl 100 mg/Folic Acid 1 mg/Sodium Chloride 1,011.2 

ml  @ 100 mls/ hr DAILY IV  Last administered on 10/18/17at 09:48;  Start 10/18/

17 at 09:00;  Stop 10/23/17 at 08:59


Lorazepam (Ativan) 2 mg PRN Q15MIN  PRN IV COMM Last administered on 10/18/17at 

09:59;  Start 10/17/17 at 13:15


Lorazepam (Ativan) 4 mg PRN Q15MIN  PRN IV COMM Last administered on 10/18/17at 

02:25;  Start 10/17/17 at 13:15


Lorazepam (Ativan) 4 mg 1X  ONCE IV  Last administered on 10/17/17at 13:27;  

Start 10/17/17 at 13:30;  Stop 10/17/17 at 13:31;  Status DC


Ondansetron HCl (Zofran) 4 mg PRN Q8HRS  PRN IV NAUSEA/VOMITING Last 

administered on 10/17/17at 15:24;  Start 10/17/17 at 13:30;  Stop 10/18/17 at 13

:29


Sodium Chloride 1,000 ml @  125 mls/hr Q8H IV  Last administered on 10/18/17at 

07:20;  Start 10/17/17 at 13:16;  Stop 10/18/17 at 13:15


Multivitamins 10 ml/Folic Acid 1 mg/Thiamine HCl 100 mg/Dextrose/ Sodium 

Chloride 1,011.1 ml  @ 100 mls/ hr DAILY IV ;  Start 10/18/17 at 09:00;  Status 

UNV


Chlordiazepoxide (Librium) 25 mg PRN Q6HRS  PRN PO ANXIETY / AGITATION;  Start 

10/17/17 at 14:15


Alprazolam (Xanax) 0.25 mg PRN Q8HRS  PRN PO ANXIETY / AGITATION;  Start 10/17/

17 at 14:15


Zolpidem Tartrate (Ambien) 5 mg PRN QHS  PRN PO INSOMNIA;  Start 10/17/17 at 14:

15


Finasteride (Proscar) 5 mg DAILY PO  Last administered on 10/18/17at 08:34;  

Start 10/18/17 at 09:00


Lithium Carbonate (Lithium Carbonate) 150 mg HS PO  Last administered on 10/17/

17at 20:06;  Start 10/17/17 at 21:00


Lorazepam (Ativan) 1 mg TID PO  Last administered on 10/18/17at 08:35;  Start 10

/17/17 at 14:30


Lorazepam (Ativan) 1 mg TID PO ;  Start 10/17/17 at 21:00;  Status UNV


Pantoprazole Sodium (Protonix) 40 mg DAILYAC PO  Last administered on 10/18/

17at 08:34;  Start 10/18/17 at 07:30


Quetiapine Fumarate (SEROquel) 25 mg HS PO  Last administered on 10/17/17at 19:

57;  Start 10/17/17 at 21:00


Citalopram Hydrobromide (CeleXA) 40 mg DAILY PO  Last administered on 10/18/

17at 08:35;  Start 10/18/17 at 09:00


Lamotrigine (LaMICtal) 150 mg DAILY PO  Last administered on 10/18/17at 08:36;  

Start 10/18/17 at 09:00


Ibuprofen (Motrin) 600 mg PRN Q6HRS  PRN PO INFLAMMATION Last administered on 10

/18/17at 08:35;  Start 10/17/17 at 16:15


Ketorolac Tromethamine (Toradol) 30 mg 1X  ONCE IV  Last administered on 10/17/

17at 16:46;  Start 10/17/17 at 16:30;  Stop 10/17/17 at 16:31;  Status DC


Metoprolol Tartrate (Lopressor) 5 mg 1X  ONCE IVP  Last administered on 10/18/

17at 11:51;  Start 10/18/17 at 12:15;  Stop 10/18/17 at 12:16





Active Scripts


Active


Ativan (Lorazepam) 1 Mg Tablet 1 Mg PO TID


Lorazepam 1 Mg Tablet 1 Tab PO TID


Reported


Lamotrigine 150 Mg Tablet 150 Mg PO DAILY


Lithium Carbonate 150 Mg Capsule 150 Mg PO HS


Escitalopram Oxalate 20 Mg Tablet 20 Mg PO DAILY


Pantoprazole Sodium 40 Mg Tablet.dr 40 Mg PO DAILY


Seroquel (Quetiapine Fumarate) 25 Mg Tablet 25 Mg PO HS


Finasteride 5 Mg Tablet 5 Mg PO DAILY


Vitals/I & O





Vital Sign - Last 24 Hours








 10/17/17 10/17/17 10/17/17 10/17/17





 12:30 13:30 15:00 16:00


 


Temp   98.8 





   98.8 


 


Pulse 84 122 136 


 


Resp   29 


 


B/P (MAP) 113/71 (85) 125/75 (92) 126/85 (99) 


 


Pulse Ox 95 95 92 


 


O2 Delivery Room Air  Room Air Room Air


 


    





    





 10/17/17 10/17/17 10/17/17 10/17/17





 16:00 17:00 18:00 20:00


 


Temp    98.8





    98.8


 


Pulse 132 114 125 116


 


Resp 29 12 22 22


 


B/P (MAP) 120/66 (84) 107/62 (77) 108/57 (74) 112/69 (83)


 


Pulse Ox 92 93 93 93


 


O2 Delivery Room Air Room Air Room Air Room Air


 


    





    





 10/17/17 10/17/17 10/17/17 10/17/17





 20:00 21:00 22:00 23:00


 


Pulse  110 106 96


 


Resp  19 22 18


 


B/P (MAP)  124/72 (89) 119/70 (86) 134/76 (95)


 


Pulse Ox  94 95 95


 


O2 Delivery Room Air Room Air Room Air Room Air





 10/17/17 10/18/17 10/18/17 10/18/17





 23:59 00:01 01:00 02:00


 


Temp  98.6  





  98.6  


 


Pulse  90 90 85


 


Resp  16 16 12


 


B/P (MAP)  119/90 (100) 125/91 (102) 123/85 (98)


 


Pulse Ox  97 96 96


 


O2 Delivery Room Air Room Air Room Air Room Air


 


    





    





 10/18/17 10/18/17 10/18/17 10/18/17





 03:00 03:55 04:00 05:00


 


Temp   98.8 





   98.8 


 


Pulse 88  81 91


 


Resp 16  14 12


 


B/P (MAP) 121/84 (96)  126/93 (104) 119/87 (98)


 


Pulse Ox 93  95 95


 


O2 Delivery Room Air Room Air Room Air Room Air


 


    





    





 10/18/17 10/18/17 10/18/17 10/18/17





 06:00 07:20 07:56 08:20


 


Temp    97.6





    97.6


 


Pulse 86 86  94


 


Resp 13 14  12


 


B/P (MAP) 121/90 (100) 121/90 (100)  149/96 (113)


 


Pulse Ox 94   


 


O2 Delivery Room Air Room Air Room Air Room Air


 


    





    





 10/18/17 10/18/17 10/18/17 10/18/17





 09:00 10:12 11:12 11:51


 


Pulse 108 104 123 127


 


Resp  17 16 


 


B/P (MAP) 155/92 (113) 153/89 (110) 158/94 (115) 158/94


 


Pulse Ox 98 95 94 


 


O2 Delivery Room Air Room Air Room Air 














Intake and Output   


 


 10/18/17 10/18/17 10/19/17





 15:00 23:00 07:00


 


Intake Total 360 ml  


 


Output Total 500 ml  


 


Balance -140 ml  

















BERTA BURDEN III DO Oct 18, 2017 12:24

## 2017-10-19 NOTE — DS
DATE OF DISCHARGE:  10/18/2017



DATE OF ADMISSION:  10/17/2017



DATE OF DISCHARGE:  10/18/2017



ADMISSION DIAGNOSIS:  Alcohol withdrawal.



DISCHARGE DIAGNOSIS:  Resolving alcohol withdrawal.



HOSPITAL COURSE:  The patient is a pleasant 57-year-old .  He presented

with alcohol withdrawal.  We gave him banana bags and benzos.  This morning, he

is doing better and wants to go home.



DISPOSITION:  Home.



ACTIVITY:  As tolerated.



DIET:  Low sodium.



MEDICATIONS:  Please see the MRAD.



TOTAL TIME ON DISCHARGE:  31 minutes.

 



______________________________

MARCIAL BRIDGETTE BURDEN DO



DR:  AMBER/mark  JOB#:  [id number] / [id number]

DD:  10/19/2017 11:12  DT:  10/19/2017 11:59

## 2017-12-04 ENCOUNTER — HOSPITAL ENCOUNTER (INPATIENT)
Dept: HOSPITAL 61 - ER | Age: 57
LOS: 2 days | Discharge: HOME | DRG: 896 | End: 2017-12-06
Attending: INTERNAL MEDICINE | Admitting: INTERNAL MEDICINE
Payer: COMMERCIAL

## 2017-12-04 VITALS — SYSTOLIC BLOOD PRESSURE: 114 MMHG | DIASTOLIC BLOOD PRESSURE: 61 MMHG

## 2017-12-04 VITALS — WEIGHT: 197.44 LBS | BODY MASS INDEX: 27.64 KG/M2 | HEIGHT: 71 IN

## 2017-12-04 VITALS — SYSTOLIC BLOOD PRESSURE: 107 MMHG | DIASTOLIC BLOOD PRESSURE: 60 MMHG

## 2017-12-04 VITALS — SYSTOLIC BLOOD PRESSURE: 132 MMHG | DIASTOLIC BLOOD PRESSURE: 67 MMHG

## 2017-12-04 VITALS — DIASTOLIC BLOOD PRESSURE: 83 MMHG | SYSTOLIC BLOOD PRESSURE: 129 MMHG

## 2017-12-04 VITALS — DIASTOLIC BLOOD PRESSURE: 67 MMHG | SYSTOLIC BLOOD PRESSURE: 117 MMHG

## 2017-12-04 VITALS — DIASTOLIC BLOOD PRESSURE: 67 MMHG | SYSTOLIC BLOOD PRESSURE: 118 MMHG

## 2017-12-04 DIAGNOSIS — G92: ICD-10-CM

## 2017-12-04 DIAGNOSIS — E87.2: ICD-10-CM

## 2017-12-04 DIAGNOSIS — Z86.711: ICD-10-CM

## 2017-12-04 DIAGNOSIS — F10.239: ICD-10-CM

## 2017-12-04 DIAGNOSIS — F41.9: ICD-10-CM

## 2017-12-04 DIAGNOSIS — E87.1: ICD-10-CM

## 2017-12-04 DIAGNOSIS — F10.229: Primary | ICD-10-CM
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
## 2017-12-04 DIAGNOSIS — F32.9: ICD-10-CM

## 2017-12-04 LAB
ALBUMIN SERPL-MCNC: 4 G/DL (ref 3.4–5)
ALBUMIN/GLOB SERPL: 1 {RATIO} (ref 1–1.7)
ALP SERPL-CCNC: 60 U/L (ref 46–116)
ALT SERPL-CCNC: 28 U/L (ref 16–63)
ANION GAP SERPL CALC-SCNC: 15 MMOL/L (ref 6–14)
AST SERPL-CCNC: 37 U/L (ref 15–37)
BACTERIA #/AREA URNS HPF: 0 /HPF
BARBITURATES UR-MCNC: (no result) UG/ML
BASOPHILS # BLD AUTO: 0.1 X10^3/UL (ref 0–0.2)
BASOPHILS NFR BLD: 1 % (ref 0–3)
BENZODIAZ UR-MCNC: (no result) UG/L
BILIRUB SERPL-MCNC: 0.4 MG/DL (ref 0.2–1)
BILIRUB UR QL STRIP: NEGATIVE
BUN SERPL-MCNC: 8 MG/DL (ref 8–26)
BUN/CREAT SERPL: 9 (ref 6–20)
CALCIUM SERPL-MCNC: 8.4 MG/DL (ref 8.5–10.1)
CANNABINOIDS UR-MCNC: (no result) UG/L
CHLORIDE SERPL-SCNC: 90 MMOL/L (ref 98–107)
CO2 SERPL-SCNC: 23 MMOL/L (ref 21–32)
COCAINE UR-MCNC: (no result) NG/ML
CREAT SERPL-MCNC: 0.9 MG/DL (ref 0.7–1.3)
EOSINOPHIL NFR BLD: 5 % (ref 0–3)
ERYTHROCYTE [DISTWIDTH] IN BLOOD BY AUTOMATED COUNT: 14.6 % (ref 11.5–14.5)
GFR SERPLBLD BASED ON 1.73 SQ M-ARVRAT: 86.7 ML/MIN
GLOBULIN SER-MCNC: 3.9 G/DL (ref 2.2–3.8)
GLUCOSE SERPL-MCNC: 98 MG/DL (ref 70–99)
GLUCOSE UR STRIP-MCNC: NEGATIVE MG/DL
HCT VFR BLD CALC: 43.1 % (ref 39–53)
HGB BLD-MCNC: 14.4 G/DL (ref 13–17.5)
LYMPHOCYTES # BLD: 1.8 X10^3/UL (ref 1–4.8)
LYMPHOCYTES NFR BLD AUTO: 20 % (ref 24–48)
MAGNESIUM SERPL-MCNC: 2.2 MG/DL (ref 1.8–2.4)
MCH RBC QN AUTO: 32 PG (ref 25–35)
MCHC RBC AUTO-ENTMCNC: 34 G/DL (ref 31–37)
MCV RBC AUTO: 95 FL (ref 79–100)
METHADONE SERPL-MCNC: (no result) NG/ML
MONOCYTES NFR BLD: 5 % (ref 0–9)
NEUTROPHILS NFR BLD AUTO: 70 % (ref 31–73)
NITRITE UR QL STRIP: NEGATIVE
OPIATES UR-MCNC: (no result) NG/ML
PCP SERPL-MCNC: (no result) MG/DL
PH UR STRIP: 7 [PH]
PLATELET # BLD AUTO: 241 X10^3/UL (ref 140–400)
POTASSIUM SERPL-SCNC: 4.4 MMOL/L (ref 3.5–5.1)
PROT SERPL-MCNC: 7.9 G/DL (ref 6.4–8.2)
PROT UR STRIP-MCNC: NEGATIVE MG/DL
RBC # BLD AUTO: 4.52 X10^6/UL (ref 4.3–5.7)
RBC #/AREA URNS HPF: 0 /HPF (ref 0–2)
SODIUM SERPL-SCNC: 128 MMOL/L (ref 136–145)
SP GR UR STRIP: <=1.005
SQUAMOUS #/AREA URNS LPF: (no result) /LPF
UROBILINOGEN UR-MCNC: 0.2 MG/DL
WBC # BLD AUTO: 9 X10^3/UL (ref 4–11)
WBC #/AREA URNS HPF: 0 /HPF (ref 0–4)

## 2017-12-04 PROCEDURE — 80053 COMPREHEN METABOLIC PANEL: CPT

## 2017-12-04 PROCEDURE — 96375 TX/PRO/DX INJ NEW DRUG ADDON: CPT

## 2017-12-04 PROCEDURE — 96374 THER/PROPH/DIAG INJ IV PUSH: CPT

## 2017-12-04 PROCEDURE — 85025 COMPLETE CBC W/AUTO DIFF WBC: CPT

## 2017-12-04 PROCEDURE — 36415 COLL VENOUS BLD VENIPUNCTURE: CPT

## 2017-12-04 PROCEDURE — 87641 MR-STAPH DNA AMP PROBE: CPT

## 2017-12-04 PROCEDURE — 96376 TX/PRO/DX INJ SAME DRUG ADON: CPT

## 2017-12-04 PROCEDURE — G0479 DRUG TEST PRESUMP NOT OPT: HCPCS

## 2017-12-04 PROCEDURE — 80048 BASIC METABOLIC PNL TOTAL CA: CPT

## 2017-12-04 PROCEDURE — 81001 URINALYSIS AUTO W/SCOPE: CPT

## 2017-12-04 PROCEDURE — 71010: CPT

## 2017-12-04 PROCEDURE — 80307 DRUG TEST PRSMV CHEM ANLYZR: CPT

## 2017-12-04 PROCEDURE — 93005 ELECTROCARDIOGRAM TRACING: CPT

## 2017-12-04 PROCEDURE — 83735 ASSAY OF MAGNESIUM: CPT

## 2017-12-04 RX ADMIN — BACITRACIN SCH MLS/HR: 5000 INJECTION, POWDER, FOR SOLUTION INTRAMUSCULAR at 21:00

## 2017-12-04 NOTE — PDOC1
History and Physical


Date of Admission


Date of Admission


DATE: 12/4/17 


TIME: 22:35





Identification/Chief Complaint


Chief Complaint


"I am in EtOH withdrawl"


Problems:  





Source


Source:  Chart review, Patient





History of Present Illness


History of Present Illness


Mr. Singh presented to the ER complaining of EtOH withdrawl .  He was tremulous 

and irritated,  HR up.   He has prior hx of EtOH abuse, and has been admitted 

here a few times before for the same. 


He is educated in the meds, and even asked the ICU RN what the benzo protocol 

is at this hospital.   It seems he may be visiting other hospitals and having 

mult withdrawl events.


He brought a large gym bag with clothes and personal items.





Past Medical History


Cardiovascular:  No pertinent hx


Psych:  Addictions, Depression


Rheumatologic:  No pertinent hx


Infectious disease:  No pertinent hx





Past Surgical History


Past Surgical History:  No pertinent history





Family History


Family History


, he works as a ,  2 kids, age 18 and 23


Family History:  No Significant, Other





Social History


Smoke:  No


ALCOHOL:  heavy


Drugs:  None





Current Problem List


Problem List


Problems


Medical Problems:


(1) Alcohol withdrawal


Status: Acute  








Problems:  





Current Medications


Current Medications





Current Medications


Multivitamins 10 ml/Thiamine HCl 100 mg/Folic Acid 1 mg/Sodium Chloride 1,011.2 

ml  @ 1,000 mls/ hr Q1H IV  Last administered on 12/4/17at 18:37;  Start 12/4/ 17 at 18:15;  Stop 12/4/17 at 18:37;  Status DC


Diazepam (Valium) 5 mg PRN Q5MIN  PRN IV COMM Last administered on 12/4/17at 21:

03;  Start 12/4/17 at 18:00


Diazepam (Valium) 10 mg PRN Q5MIN  PRN IV COMM;  Start 12/4/17 at 18:00


Ondansetron HCl (Zofran) 4 mg PRN Q8HRS  PRN IV NAUSEA/VOMITING;  Start 12/4/17 

at 20:15;  Stop 12/5/17 at 20:14


Sodium Chloride 1,000 ml @  125 mls/hr Q8H IV  Last administered on 12/4/17at 21

:00;  Start 12/4/17 at 20:15;  Stop 12/5/17 at 20:14


Acetaminophen (Tylenol) 650 mg PRN Q4HRS  PRN PO FEVER;  Start 12/4/17 at 20:15

;  Stop 12/5/17 at 20:14


Lorazepam (Ativan) 4 mg PRN Q1HR  PRN PO For CIWA 8-14;  Start 12/4/17 at 21:45


Lorazepam (Ativan) 8 mg PRN Q1HR  PRN PO For CIWA 15 or greater;  Start 12/4/17 

at 21:45


Haloperidol Lactate (Haldol) 5 mg PRN Q4HRS  PRN IVP Hallucinatns,Confusn,

Delirium;  Start 12/4/17 at 21:45


Diphenhydramine HCl (Benadryl) 25 mg PRN Q15MIN  PRN IVP EPS symptoms 2'Haldol 

admin;  Start 12/4/17 at 21:45


Clonidine HCl (Catapres) 0.1 mg PRN Q1HR  PRN PO SBP > 180 or DBP > 100, MRX3;  

Start 12/4/17 at 21:45


Lorazepam (Ativan) 4 mg PRN Q1HR  PRN IV For CIWA 15 or greater Last 

administered on 12/4/17at 21:51;  Start 12/4/17 at 21:45





Active Scripts


Active


Reported


Ativan (Lorazepam) 1 Mg Tablet 1 Mg PO Q12HR PRN


Metoprolol Tartrate 25 Mg Tablet 1 Tab PO BID


Lamotrigine 150 Mg Tablet 150 Mg PO DAILY


Lithium Carbonate 150 Mg Capsule 150 Mg PO HS


Escitalopram Oxalate 20 Mg Tablet 20 Mg PO DAILY


Pantoprazole Sodium 40 Mg Tablet.dr 40 Mg PO DAILY


Seroquel (Quetiapine Fumarate) 25 Mg Tablet 25 Mg PO HS


Finasteride 5 Mg Tablet 5 Mg PO DAILY





Allergies


Allergies:  


Coded Allergies:  


     Penicillins (Verified  Allergy, Intermediate, Rash, 5/6/16)





ROS


General:  No: Chills, Night Sweats, Fatigue, Malaise, Appetite, Other


PSYCHOLOGICAL ROS:  YES: Anxiety, Depression, Irritablity, Sleep disturbances, 


   No: Behavioral Disorder, Concentration difficultie, Decreased libido, 

Disorientation, Hallucinations, Hostility, Memory difficulties, Mood Swings, 

Obsessive thoughts, Other


Eyes:  No Blurry vision, No Decreased vision, No Double vision, No Dry eyes, No 

Excessive tearing, No Eye Pain, No Itchy Eyes, No Loss of vision, No Photophobia

, No Scotomata, No Uses contacts, No Uses glasses, No Other


HEENT:  No: Heacaches, Visual Changes, Hearing change, Nasal congestion, Nasal 

discharge, Oral lesions, Sinus pain, Sore Throat, Epistaxis, Sneezing, Snoring, 

Tinnitus, Vertigo, Vocal changes, Other


Respiratory:  No: Cough, Hemoptysis, Orthopnea, Pleuritic Pain, Shortness of 

breath, SOB with excertion, Sputum Changes, Stridor, Tachypnea, Wheezing, Other


Cardiovascular:  yes Palpitations, yes Other, 


   No Chest Pain, No Orthopnea, No Paroxysmal Noc. Dyspnea, No Edema, No Lt 

Headedness


Gastrointestinal:  Yes Nausea, Yes Abdominal Pain, 


   No Vomiting, No Diarrhea, No Constipation, No Melena, No Hematochezia, No 

Other


Genitourinary:  No Dysuria, No Frequency, No Incontinence, No Hematuria, No 

Retention, No Discharge, No Urgency, No Pain, No Flank Pain, No Other, No , No 

, No , No , No , No , No 


Musculoskeletal:  No Gait Disturbance, No Joint Pain, No Joint Stiffness, No 

Joint Swelling, No Muscle Pain, No Muscular Weakness, No Pain In:, No Swelling 

In:, No Other


Neurological:  No Behavorial Changes, No Bowel/Bladder ControlChng, No Confusion

, No Dizziness, No Gait Disturbance, No Headaches, No Impaired Coord/balance, 

No Memory Loss, No Numbness/Tingling, No Seizures, No Speech Problems, No 

Tremors, No Visual Changes, No Weakness, No Other


Skin:  Yes Dry Skin, 


   No Eczema, No Hair Changes, No Lumps, No Mole Changes, No Mottling, No Nail 

Changes, No Pruritus, No Rash, No Skin Lesion Changes, No Other, No Acne





Physical Exam


General:  Alert, Oriented X3, mild distress, moderate distress


HEENT:  Atraumatic, PERRLA, EOMI


Heart:  no murmurs, other (tachy)


Abdomen:  Normal bowel sounds, Soft


Rectal Exam:  not examined


Extremities:  No clubbing, No edema, Normal pulses


Skin:  No significant lesion


Neuro:  Normal speech, Sensation intact, Other (pressured, )


Psych/Mental Status:  Other (agitated, does not remember me, I have seen him 

twice before)





Vitals


Vitals





Vital Signs








  Date Time  Temp Pulse Resp B/P (MAP) Pulse Ox O2 Delivery O2 Flow Rate FiO2


 


12/4/17 21:48  104 18 135/76 (95) 98 Room Air  


 


12/4/17 17:35 98.0       





 98.0       











Labs


Labs





Laboratory Tests








Test


  12/4/17


18:15 12/4/17


18:25


 


Urine Collection Type Void  


 


Urine Color Yellow  


 


Urine Clarity Clear  


 


Urine pH 7.0  


 


Urine Specific Gravity <=1.005  


 


Urine Protein


  Negative mg/dL


(NEG-TRACE) 


 


 


Urine Glucose (UA)


  Negative mg/dL


(NEG) 


 


 


Urine Ketones (Stick)


  Negative mg/dL


(NEG) 


 


 


Urine Blood Negative (NEG)  


 


Urine Nitrite Negative (NEG)  


 


Urine Bilirubin Negative (NEG)  


 


Urine Urobilinogen Dipstick


  0.2 mg/dL (0.2


mg/dL) 


 


 


Urine Leukocyte Esterase Negative (NEG)  


 


Urine RBC 0 /HPF (0-2)  


 


Urine WBC 0 /HPF (0-4)  


 


Urine Squamous Epithelial


Cells None /LPF 


  


 


 


Urine Bacteria 0 /HPF (0-FEW)  


 


Urine Opiates Screen Neg (NEG)  


 


Urine Methadone Screen Neg (NEG)  


 


Urine Barbiturates Neg (NEG)  


 


Urine Phencyclidine Screen Neg (NEG)  


 


Urine


Amphetamine/Methamphetamine Neg (NEG) 


  


 


 


Urine Benzodiazepines Screen Neg (NEG)  


 


Urine Cocaine Screen Neg (NEG)  


 


Urine Cannabinoids Screen Neg (NEG)  


 


Urine Ethyl Alcohol Pos (NEG)  


 


White Blood Count


  


  9.0 x10^3/uL


(4.0-11.0)


 


Red Blood Count


  


  4.52 x10^6/uL


(4.30-5.70)


 


Hemoglobin


  


  14.4 g/dL


(13.0-17.5)


 


Hematocrit


  


  43.1 %


(39.0-53.0)


 


Mean Corpuscular Volume  95 fL () 


 


Mean Corpuscular Hemoglobin  32 pg (25-35) 


 


Mean Corpuscular Hemoglobin


Concent 


  34 g/dL


(31-37)


 


Red Cell Distribution Width


  


  14.6 %


(11.5-14.5)


 


Platelet Count


  


  241 x10^3/uL


(140-400)


 


Neutrophils (%) (Auto)  70 % (31-73) 


 


Lymphocytes (%) (Auto)  20 % (24-48) 


 


Monocytes (%) (Auto)  5 % (0-9) 


 


Eosinophils (%) (Auto)  5 % (0-3) 


 


Basophils (%) (Auto)  1 % (0-3) 


 


Neutrophils # (Auto)


  


  6.3 x10^3uL


(1.8-7.7)


 


Lymphocytes # (Auto)


  


  1.8 x10^3/uL


(1.0-4.8)


 


Monocytes # (Auto)


  


  0.4 x10^3/uL


(0.0-1.1)


 


Eosinophils # (Auto)


  


  0.4 x10^3/uL


(0.0-0.7)


 


Basophils # (Auto)


  


  0.1 x10^3/uL


(0.0-0.2)


 


Sodium Level


  


  128 mmol/L


(136-145)


 


Potassium Level


  


  4.4 mmol/L


(3.5-5.1)


 


Chloride Level


  


  90 mmol/L


()


 


Carbon Dioxide Level


  


  23 mmol/L


(21-32)


 


Anion Gap  15 (6-14) 


 


Blood Urea Nitrogen  8 mg/dL (8-26) 


 


Creatinine


  


  0.9 mg/dL


(0.7-1.3)


 


Estimated GFR


(Cockcroft-Gault) 


  86.7 


 


 


BUN/Creatinine Ratio  9 (6-20) 


 


Glucose Level


  


  98 mg/dL


(70-99)


 


Calcium Level


  


  8.4 mg/dL


(8.5-10.1)


 


Magnesium Level


  


  2.2 mg/dL


(1.8-2.4)


 


Total Bilirubin


  


  0.4 mg/dL


(0.2-1.0)


 


Aspartate Amino Transf


(AST/SGOT) 


  37 U/L (15-37) 


 


 


Alanine Aminotransferase


(ALT/SGPT) 


  28 U/L (16-63) 


 


 


Alkaline Phosphatase


  


  60 U/L


()


 


Total Protein


  


  7.9 g/dL


(6.4-8.2)


 


Albumin


  


  4.0 g/dL


(3.4-5.0)


 


Albumin/Globulin Ratio  1.0 (1.0-1.7) 


 


Ethyl Alcohol Level


  


  363 mg/dL


(0-10)








Laboratory Tests








Test


  12/4/17


18:15 12/4/17


18:25


 


Urine Collection Type Void  


 


Urine Color Yellow  


 


Urine Clarity Clear  


 


Urine pH 7.0  


 


Urine Specific Gravity <=1.005  


 


Urine Protein


  Negative mg/dL


(NEG-TRACE) 


 


 


Urine Glucose (UA)


  Negative mg/dL


(NEG) 


 


 


Urine Ketones (Stick)


  Negative mg/dL


(NEG) 


 


 


Urine Blood Negative (NEG)  


 


Urine Nitrite Negative (NEG)  


 


Urine Bilirubin Negative (NEG)  


 


Urine Urobilinogen Dipstick


  0.2 mg/dL (0.2


mg/dL) 


 


 


Urine Leukocyte Esterase Negative (NEG)  


 


Urine RBC 0 /HPF (0-2)  


 


Urine WBC 0 /HPF (0-4)  


 


Urine Squamous Epithelial


Cells None /LPF 


  


 


 


Urine Bacteria 0 /HPF (0-FEW)  


 


Urine Opiates Screen Neg (NEG)  


 


Urine Methadone Screen Neg (NEG)  


 


Urine Barbiturates Neg (NEG)  


 


Urine Phencyclidine Screen Neg (NEG)  


 


Urine


Amphetamine/Methamphetamine Neg (NEG) 


  


 


 


Urine Benzodiazepines Screen Neg (NEG)  


 


Urine Cocaine Screen Neg (NEG)  


 


Urine Cannabinoids Screen Neg (NEG)  


 


Urine Ethyl Alcohol Pos (NEG)  


 


White Blood Count


  


  9.0 x10^3/uL


(4.0-11.0)


 


Red Blood Count


  


  4.52 x10^6/uL


(4.30-5.70)


 


Hemoglobin


  


  14.4 g/dL


(13.0-17.5)


 


Hematocrit


  


  43.1 %


(39.0-53.0)


 


Mean Corpuscular Volume  95 fL () 


 


Mean Corpuscular Hemoglobin  32 pg (25-35) 


 


Mean Corpuscular Hemoglobin


Concent 


  34 g/dL


(31-37)


 


Red Cell Distribution Width


  


  14.6 %


(11.5-14.5)


 


Platelet Count


  


  241 x10^3/uL


(140-400)


 


Neutrophils (%) (Auto)  70 % (31-73) 


 


Lymphocytes (%) (Auto)  20 % (24-48) 


 


Monocytes (%) (Auto)  5 % (0-9) 


 


Eosinophils (%) (Auto)  5 % (0-3) 


 


Basophils (%) (Auto)  1 % (0-3) 


 


Neutrophils # (Auto)


  


  6.3 x10^3uL


(1.8-7.7)


 


Lymphocytes # (Auto)


  


  1.8 x10^3/uL


(1.0-4.8)


 


Monocytes # (Auto)


  


  0.4 x10^3/uL


(0.0-1.1)


 


Eosinophils # (Auto)


  


  0.4 x10^3/uL


(0.0-0.7)


 


Basophils # (Auto)


  


  0.1 x10^3/uL


(0.0-0.2)


 


Sodium Level


  


  128 mmol/L


(136-145)


 


Potassium Level


  


  4.4 mmol/L


(3.5-5.1)


 


Chloride Level


  


  90 mmol/L


()


 


Carbon Dioxide Level


  


  23 mmol/L


(21-32)


 


Anion Gap  15 (6-14) 


 


Blood Urea Nitrogen  8 mg/dL (8-26) 


 


Creatinine


  


  0.9 mg/dL


(0.7-1.3)


 


Estimated GFR


(Cockcroft-Gault) 


  86.7 


 


 


BUN/Creatinine Ratio  9 (6-20) 


 


Glucose Level


  


  98 mg/dL


(70-99)


 


Calcium Level


  


  8.4 mg/dL


(8.5-10.1)


 


Magnesium Level


  


  2.2 mg/dL


(1.8-2.4)


 


Total Bilirubin


  


  0.4 mg/dL


(0.2-1.0)


 


Aspartate Amino Transf


(AST/SGOT) 


  37 U/L (15-37) 


 


 


Alanine Aminotransferase


(ALT/SGPT) 


  28 U/L (16-63) 


 


 


Alkaline Phosphatase


  


  60 U/L


()


 


Total Protein


  


  7.9 g/dL


(6.4-8.2)


 


Albumin


  


  4.0 g/dL


(3.4-5.0)


 


Albumin/Globulin Ratio  1.0 (1.0-1.7) 


 


Ethyl Alcohol Level


  


  363 mg/dL


(0-10)











VTE Prophylaxis Ordered


VTE Prophylaxis Devices:  No


VTE Pharmacological Prophylaxi:  Yes





Assessment/Plan


Assessment/Plan


Acute toxic encephalopathy


EtOH abuse and intoxication


Gap metabolic acidosis,   EtOH





Hyponatremia,   beer potomania,  dry,  hydrate,  banana bag given





EtOH withdrawl symptoms already,   he felt no benefit to 15mg valium


will order ativan,   some concern for a prior history of addiction,   but CIWS 

score now 15 in ER





admit











MIRELLA COX MD Dec 4, 2017 22:42

## 2017-12-05 VITALS — DIASTOLIC BLOOD PRESSURE: 66 MMHG | SYSTOLIC BLOOD PRESSURE: 99 MMHG

## 2017-12-05 VITALS — SYSTOLIC BLOOD PRESSURE: 140 MMHG | DIASTOLIC BLOOD PRESSURE: 94 MMHG

## 2017-12-05 VITALS — DIASTOLIC BLOOD PRESSURE: 59 MMHG | SYSTOLIC BLOOD PRESSURE: 105 MMHG

## 2017-12-05 VITALS — SYSTOLIC BLOOD PRESSURE: 142 MMHG | DIASTOLIC BLOOD PRESSURE: 90 MMHG

## 2017-12-05 VITALS — SYSTOLIC BLOOD PRESSURE: 96 MMHG | DIASTOLIC BLOOD PRESSURE: 49 MMHG

## 2017-12-05 VITALS — DIASTOLIC BLOOD PRESSURE: 91 MMHG | SYSTOLIC BLOOD PRESSURE: 144 MMHG

## 2017-12-05 VITALS — SYSTOLIC BLOOD PRESSURE: 99 MMHG | DIASTOLIC BLOOD PRESSURE: 66 MMHG

## 2017-12-05 VITALS — DIASTOLIC BLOOD PRESSURE: 89 MMHG | SYSTOLIC BLOOD PRESSURE: 128 MMHG

## 2017-12-05 VITALS — DIASTOLIC BLOOD PRESSURE: 89 MMHG | SYSTOLIC BLOOD PRESSURE: 138 MMHG

## 2017-12-05 LAB
ANION GAP SERPL CALC-SCNC: 10 MMOL/L (ref 6–14)
BASOPHILS # BLD AUTO: 0 X10^3/UL (ref 0–0.2)
BASOPHILS NFR BLD: 1 % (ref 0–3)
BUN SERPL-MCNC: 9 MG/DL (ref 8–26)
CALCIUM SERPL-MCNC: 8.1 MG/DL (ref 8.5–10.1)
CHLORIDE SERPL-SCNC: 105 MMOL/L (ref 98–107)
CO2 SERPL-SCNC: 23 MMOL/L (ref 21–32)
CREAT SERPL-MCNC: 0.8 MG/DL (ref 0.7–1.3)
EOSINOPHIL NFR BLD: 6 % (ref 0–3)
ERYTHROCYTE [DISTWIDTH] IN BLOOD BY AUTOMATED COUNT: 14.7 % (ref 11.5–14.5)
GFR SERPLBLD BASED ON 1.73 SQ M-ARVRAT: 99.3 ML/MIN
GLUCOSE SERPL-MCNC: 104 MG/DL (ref 70–99)
HCT VFR BLD CALC: 37.7 % (ref 39–53)
HGB BLD-MCNC: 12.4 G/DL (ref 13–17.5)
LYMPHOCYTES # BLD: 1.6 X10^3/UL (ref 1–4.8)
LYMPHOCYTES NFR BLD AUTO: 31 % (ref 24–48)
MCH RBC QN AUTO: 32 PG (ref 25–35)
MCHC RBC AUTO-ENTMCNC: 33 G/DL (ref 31–37)
MCV RBC AUTO: 97 FL (ref 79–100)
MONOCYTES NFR BLD: 13 % (ref 0–9)
NEUTROPHILS NFR BLD AUTO: 50 % (ref 31–73)
PLATELET # BLD AUTO: 203 X10^3/UL (ref 140–400)
POTASSIUM SERPL-SCNC: 4.3 MMOL/L (ref 3.5–5.1)
RBC # BLD AUTO: 3.88 X10^6/UL (ref 4.3–5.7)
SODIUM SERPL-SCNC: 138 MMOL/L (ref 136–145)
WBC # BLD AUTO: 5.1 X10^3/UL (ref 4–11)

## 2017-12-05 RX ADMIN — ACETAMINOPHEN PRN MG: 325 TABLET, FILM COATED ORAL at 16:54

## 2017-12-05 RX ADMIN — BACITRACIN SCH MLS/HR: 5000 INJECTION, POWDER, FOR SOLUTION INTRAMUSCULAR at 05:09

## 2017-12-05 RX ADMIN — QUETIAPINE FUMARATE SCH MG: 25 TABLET, FILM COATED ORAL at 23:00

## 2017-12-05 RX ADMIN — QUETIAPINE FUMARATE SCH MG: 25 TABLET, FILM COATED ORAL at 00:50

## 2017-12-05 RX ADMIN — BACITRACIN SCH MLS/HR: 5000 INJECTION, POWDER, FOR SOLUTION INTRAMUSCULAR at 12:12

## 2017-12-05 RX ADMIN — ACETAMINOPHEN PRN MG: 325 TABLET, FILM COATED ORAL at 08:33

## 2017-12-05 RX ADMIN — ONDANSETRON PRN MG: 2 INJECTION INTRAMUSCULAR; INTRAVENOUS at 14:55

## 2017-12-05 RX ADMIN — ONDANSETRON PRN MG: 2 INJECTION INTRAMUSCULAR; INTRAVENOUS at 08:05

## 2017-12-05 NOTE — EKG
[facility]

              [address] [place] [address]

Test Date:    2017               Test Time:    18:40:27

Pat Name:     KALEIGH LUIS             Department:   

Patient ID:   [id number]           Room:          

Gender:       M                        Technician:   

:          1960               Requested By: KIANA SEWELL

Order Number: [id number]            Reading MD:     

                                 Measurements

Intervals                              Axis          

Rate:         97                       P:            43

NJ:           150                      QRS:          36

QRSD:         98                       T:            23

QT:           348                                    

QTc:          446                                    

                           Interpretive Statements

SINUS RHYTHM

QRS(T) CONTOUR ABNORMALITY

CONSIDER ANTEROLATERAL MYOCARDIAL DAMAGE

POSSIBLY ABNORMAL ECG

RI6.01

No previous ECG available for comparison

## 2017-12-05 NOTE — PDOC
PROGRESS NOTES


Chief Complaint


Chief Complaint


alcohol intoxication, with levels greater than 300 on admission





History of Present Illness


History of Present Illness


 still shaky.


Gets better when active  is given.


Did not eat much today.


Went to the bathroom gait somewhat unsteady today 


discussed with MERLYN Mejia 





PlaN of care:


Not ready for discharge today


PT OT tomorrow


Continue alcohol withdrawal precautions. 





hopefully will be able to discharge tomorrow when get more steady. 


Discussed with MERLYN Mejia


Start Librium





Vitals


Vitals





Vital Signs








  Date Time  Temp Pulse Resp B/P (MAP) Pulse Ox O2 Delivery O2 Flow Rate FiO2


 


12/5/17 08:00      Room Air  


 


12/5/17 07:00 98.1 98 20 138/89 (105) 95  2.0 





 98.1       











Physical Exam


General:  Alert, Oriented X3, mild distress, moderate distress


Lungs:  Clear


Abdomen:  Normal bowel sounds, Soft


Extremities:  No clubbing, No edema, Normal pulses


Skin:  No significant lesion





Labs


LABS





Laboratory Tests








Test


  12/4/17


18:15 12/4/17


18:25 12/5/17


06:00


 


Urine Collection Type Void   


 


Urine Color Yellow   


 


Urine Clarity Clear   


 


Urine pH 7.0   


 


Urine Specific Gravity <=1.005   


 


Urine Protein


  Negative mg/dL


(NEG-TRACE) 


  


 


 


Urine Glucose (UA)


  Negative mg/dL


(NEG) 


  


 


 


Urine Ketones (Stick)


  Negative mg/dL


(NEG) 


  


 


 


Urine Blood Negative (NEG)   


 


Urine Nitrite Negative (NEG)   


 


Urine Bilirubin Negative (NEG)   


 


Urine Urobilinogen Dipstick


  0.2 mg/dL (0.2


mg/dL) 


  


 


 


Urine Leukocyte Esterase Negative (NEG)   


 


Urine RBC 0 /HPF (0-2)   


 


Urine WBC 0 /HPF (0-4)   


 


Urine Squamous Epithelial


Cells None /LPF 


  


  


 


 


Urine Bacteria 0 /HPF (0-FEW)   


 


Urine Opiates Screen Neg (NEG)   


 


Urine Methadone Screen Neg (NEG)   


 


Urine Barbiturates Neg (NEG)   


 


Urine Phencyclidine Screen Neg (NEG)   


 


Urine


Amphetamine/Methamphetamine Neg (NEG) 


  


  


 


 


Urine Benzodiazepines Screen Neg (NEG)   


 


Urine Cocaine Screen Neg (NEG)   


 


Urine Cannabinoids Screen Neg (NEG)   


 


Urine Ethyl Alcohol Pos (NEG)   


 


White Blood Count


  


  9.0 x10^3/uL


(4.0-11.0) 5.1 x10^3/uL


(4.0-11.0)


 


Red Blood Count


  


  4.52 x10^6/uL


(4.30-5.70) 3.88 x10^6/uL


(4.30-5.70)


 


Hemoglobin


  


  14.4 g/dL


(13.0-17.5) 12.4 g/dL


(13.0-17.5)


 


Hematocrit


  


  43.1 %


(39.0-53.0) 37.7 %


(39.0-53.0)


 


Mean Corpuscular Volume  95 fL ()  97 fL () 


 


Mean Corpuscular Hemoglobin  32 pg (25-35)  32 pg (25-35) 


 


Mean Corpuscular Hemoglobin


Concent 


  34 g/dL


(31-37) 33 g/dL


(31-37)


 


Red Cell Distribution Width


  


  14.6 %


(11.5-14.5) 14.7 %


(11.5-14.5)


 


Platelet Count


  


  241 x10^3/uL


(140-400) 203 x10^3/uL


(140-400)


 


Neutrophils (%) (Auto)  70 % (31-73)  50 % (31-73) 


 


Lymphocytes (%) (Auto)  20 % (24-48)  31 % (24-48) 


 


Monocytes (%) (Auto)  5 % (0-9)  13 % (0-9) 


 


Eosinophils (%) (Auto)  5 % (0-3)  6 % (0-3) 


 


Basophils (%) (Auto)  1 % (0-3)  1 % (0-3) 


 


Neutrophils # (Auto)


  


  6.3 x10^3uL


(1.8-7.7) 2.5 x10^3uL


(1.8-7.7)


 


Lymphocytes # (Auto)


  


  1.8 x10^3/uL


(1.0-4.8) 1.6 x10^3/uL


(1.0-4.8)


 


Monocytes # (Auto)


  


  0.4 x10^3/uL


(0.0-1.1) 0.7 x10^3/uL


(0.0-1.1)


 


Eosinophils # (Auto)


  


  0.4 x10^3/uL


(0.0-0.7) 0.3 x10^3/uL


(0.0-0.7)


 


Basophils # (Auto)


  


  0.1 x10^3/uL


(0.0-0.2) 0.0 x10^3/uL


(0.0-0.2)


 


Sodium Level


  


  128 mmol/L


(136-145) 138 mmol/L


(136-145)


 


Potassium Level


  


  4.4 mmol/L


(3.5-5.1) 4.3 mmol/L


(3.5-5.1)


 


Chloride Level


  


  90 mmol/L


() 105 mmol/L


()


 


Carbon Dioxide Level


  


  23 mmol/L


(21-32) 23 mmol/L


(21-32)


 


Anion Gap  15 (6-14)  10 (6-14) 


 


Blood Urea Nitrogen  8 mg/dL (8-26)  9 mg/dL (8-26) 


 


Creatinine


  


  0.9 mg/dL


(0.7-1.3) 0.8 mg/dL


(0.7-1.3)


 


Estimated GFR


(Cockcroft-Gault) 


  86.7 


  99.3 


 


 


BUN/Creatinine Ratio  9 (6-20)  


 


Glucose Level


  


  98 mg/dL


(70-99) 104 mg/dL


(70-99)


 


Calcium Level


  


  8.4 mg/dL


(8.5-10.1) 8.1 mg/dL


(8.5-10.1)


 


Magnesium Level


  


  2.2 mg/dL


(1.8-2.4) 


 


 


Total Bilirubin


  


  0.4 mg/dL


(0.2-1.0) 


 


 


Aspartate Amino Transf


(AST/SGOT) 


  37 U/L (15-37) 


  


 


 


Alanine Aminotransferase


(ALT/SGPT) 


  28 U/L (16-63) 


  


 


 


Alkaline Phosphatase


  


  60 U/L


() 


 


 


Total Protein


  


  7.9 g/dL


(6.4-8.2) 


 


 


Albumin


  


  4.0 g/dL


(3.4-5.0) 


 


 


Albumin/Globulin Ratio  1.0 (1.0-1.7)  


 


Ethyl Alcohol Level


  


  363 mg/dL


(0-10) 


 











Review of Systems


Review of Systems


Shaky, nauseated, unsteady, still weak, no vomiting, no chest pain, no 

shortness of breath, no fevers





Assessment and Plan


Assessmemt and Plan


Problems


Medical Problems:


(1) Alcohol withdrawal


Status: Acute  





(2) Anxiety


Status: Acute  








Problems:  





Comment


Review of Relevant


I have reviewed the following items ayush (where applicable) has been applied.


Labs





Laboratory Tests








Test


  12/4/17


18:15 12/4/17


18:25 12/5/17


06:00


 


Urine Collection Type Void   


 


Urine Color Yellow   


 


Urine Clarity Clear   


 


Urine pH 7.0   


 


Urine Specific Gravity <=1.005   


 


Urine Protein


  Negative mg/dL


(NEG-TRACE) 


  


 


 


Urine Glucose (UA)


  Negative mg/dL


(NEG) 


  


 


 


Urine Ketones (Stick)


  Negative mg/dL


(NEG) 


  


 


 


Urine Blood Negative (NEG)   


 


Urine Nitrite Negative (NEG)   


 


Urine Bilirubin Negative (NEG)   


 


Urine Urobilinogen Dipstick


  0.2 mg/dL (0.2


mg/dL) 


  


 


 


Urine Leukocyte Esterase Negative (NEG)   


 


Urine RBC 0 /HPF (0-2)   


 


Urine WBC 0 /HPF (0-4)   


 


Urine Squamous Epithelial


Cells None /LPF 


  


  


 


 


Urine Bacteria 0 /HPF (0-FEW)   


 


Urine Opiates Screen Neg (NEG)   


 


Urine Methadone Screen Neg (NEG)   


 


Urine Barbiturates Neg (NEG)   


 


Urine Phencyclidine Screen Neg (NEG)   


 


Urine


Amphetamine/Methamphetamine Neg (NEG) 


  


  


 


 


Urine Benzodiazepines Screen Neg (NEG)   


 


Urine Cocaine Screen Neg (NEG)   


 


Urine Cannabinoids Screen Neg (NEG)   


 


Urine Ethyl Alcohol Pos (NEG)   


 


White Blood Count


  


  9.0 x10^3/uL


(4.0-11.0) 5.1 x10^3/uL


(4.0-11.0)


 


Red Blood Count


  


  4.52 x10^6/uL


(4.30-5.70) 3.88 x10^6/uL


(4.30-5.70)


 


Hemoglobin


  


  14.4 g/dL


(13.0-17.5) 12.4 g/dL


(13.0-17.5)


 


Hematocrit


  


  43.1 %


(39.0-53.0) 37.7 %


(39.0-53.0)


 


Mean Corpuscular Volume  95 fL ()  97 fL () 


 


Mean Corpuscular Hemoglobin  32 pg (25-35)  32 pg (25-35) 


 


Mean Corpuscular Hemoglobin


Concent 


  34 g/dL


(31-37) 33 g/dL


(31-37)


 


Red Cell Distribution Width


  


  14.6 %


(11.5-14.5) 14.7 %


(11.5-14.5)


 


Platelet Count


  


  241 x10^3/uL


(140-400) 203 x10^3/uL


(140-400)


 


Neutrophils (%) (Auto)  70 % (31-73)  50 % (31-73) 


 


Lymphocytes (%) (Auto)  20 % (24-48)  31 % (24-48) 


 


Monocytes (%) (Auto)  5 % (0-9)  13 % (0-9) 


 


Eosinophils (%) (Auto)  5 % (0-3)  6 % (0-3) 


 


Basophils (%) (Auto)  1 % (0-3)  1 % (0-3) 


 


Neutrophils # (Auto)


  


  6.3 x10^3uL


(1.8-7.7) 2.5 x10^3uL


(1.8-7.7)


 


Lymphocytes # (Auto)


  


  1.8 x10^3/uL


(1.0-4.8) 1.6 x10^3/uL


(1.0-4.8)


 


Monocytes # (Auto)


  


  0.4 x10^3/uL


(0.0-1.1) 0.7 x10^3/uL


(0.0-1.1)


 


Eosinophils # (Auto)


  


  0.4 x10^3/uL


(0.0-0.7) 0.3 x10^3/uL


(0.0-0.7)


 


Basophils # (Auto)


  


  0.1 x10^3/uL


(0.0-0.2) 0.0 x10^3/uL


(0.0-0.2)


 


Sodium Level


  


  128 mmol/L


(136-145) 138 mmol/L


(136-145)


 


Potassium Level


  


  4.4 mmol/L


(3.5-5.1) 4.3 mmol/L


(3.5-5.1)


 


Chloride Level


  


  90 mmol/L


() 105 mmol/L


()


 


Carbon Dioxide Level


  


  23 mmol/L


(21-32) 23 mmol/L


(21-32)


 


Anion Gap  15 (6-14)  10 (6-14) 


 


Blood Urea Nitrogen  8 mg/dL (8-26)  9 mg/dL (8-26) 


 


Creatinine


  


  0.9 mg/dL


(0.7-1.3) 0.8 mg/dL


(0.7-1.3)


 


Estimated GFR


(Cockcroft-Gault) 


  86.7 


  99.3 


 


 


BUN/Creatinine Ratio  9 (6-20)  


 


Glucose Level


  


  98 mg/dL


(70-99) 104 mg/dL


(70-99)


 


Calcium Level


  


  8.4 mg/dL


(8.5-10.1) 8.1 mg/dL


(8.5-10.1)


 


Magnesium Level


  


  2.2 mg/dL


(1.8-2.4) 


 


 


Total Bilirubin


  


  0.4 mg/dL


(0.2-1.0) 


 


 


Aspartate Amino Transf


(AST/SGOT) 


  37 U/L (15-37) 


  


 


 


Alanine Aminotransferase


(ALT/SGPT) 


  28 U/L (16-63) 


  


 


 


Alkaline Phosphatase


  


  60 U/L


() 


 


 


Total Protein


  


  7.9 g/dL


(6.4-8.2) 


 


 


Albumin


  


  4.0 g/dL


(3.4-5.0) 


 


 


Albumin/Globulin Ratio  1.0 (1.0-1.7)  


 


Ethyl Alcohol Level


  


  363 mg/dL


(0-10) 


 








Laboratory Tests








Test


  12/4/17


18:15 12/4/17


18:25 12/5/17


06:00


 


Urine Collection Type Void   


 


Urine Color Yellow   


 


Urine Clarity Clear   


 


Urine pH 7.0   


 


Urine Specific Gravity <=1.005   


 


Urine Protein


  Negative mg/dL


(NEG-TRACE) 


  


 


 


Urine Glucose (UA)


  Negative mg/dL


(NEG) 


  


 


 


Urine Ketones (Stick)


  Negative mg/dL


(NEG) 


  


 


 


Urine Blood Negative (NEG)   


 


Urine Nitrite Negative (NEG)   


 


Urine Bilirubin Negative (NEG)   


 


Urine Urobilinogen Dipstick


  0.2 mg/dL (0.2


mg/dL) 


  


 


 


Urine Leukocyte Esterase Negative (NEG)   


 


Urine RBC 0 /HPF (0-2)   


 


Urine WBC 0 /HPF (0-4)   


 


Urine Squamous Epithelial


Cells None /LPF 


  


  


 


 


Urine Bacteria 0 /HPF (0-FEW)   


 


Urine Opiates Screen Neg (NEG)   


 


Urine Methadone Screen Neg (NEG)   


 


Urine Barbiturates Neg (NEG)   


 


Urine Phencyclidine Screen Neg (NEG)   


 


Urine


Amphetamine/Methamphetamine Neg (NEG) 


  


  


 


 


Urine Benzodiazepines Screen Neg (NEG)   


 


Urine Cocaine Screen Neg (NEG)   


 


Urine Cannabinoids Screen Neg (NEG)   


 


Urine Ethyl Alcohol Pos (NEG)   


 


White Blood Count


  


  9.0 x10^3/uL


(4.0-11.0) 5.1 x10^3/uL


(4.0-11.0)


 


Red Blood Count


  


  4.52 x10^6/uL


(4.30-5.70) 3.88 x10^6/uL


(4.30-5.70)


 


Hemoglobin


  


  14.4 g/dL


(13.0-17.5) 12.4 g/dL


(13.0-17.5)


 


Hematocrit


  


  43.1 %


(39.0-53.0) 37.7 %


(39.0-53.0)


 


Mean Corpuscular Volume  95 fL ()  97 fL () 


 


Mean Corpuscular Hemoglobin  32 pg (25-35)  32 pg (25-35) 


 


Mean Corpuscular Hemoglobin


Concent 


  34 g/dL


(31-37) 33 g/dL


(31-37)


 


Red Cell Distribution Width


  


  14.6 %


(11.5-14.5) 14.7 %


(11.5-14.5)


 


Platelet Count


  


  241 x10^3/uL


(140-400) 203 x10^3/uL


(140-400)


 


Neutrophils (%) (Auto)  70 % (31-73)  50 % (31-73) 


 


Lymphocytes (%) (Auto)  20 % (24-48)  31 % (24-48) 


 


Monocytes (%) (Auto)  5 % (0-9)  13 % (0-9) 


 


Eosinophils (%) (Auto)  5 % (0-3)  6 % (0-3) 


 


Basophils (%) (Auto)  1 % (0-3)  1 % (0-3) 


 


Neutrophils # (Auto)


  


  6.3 x10^3uL


(1.8-7.7) 2.5 x10^3uL


(1.8-7.7)


 


Lymphocytes # (Auto)


  


  1.8 x10^3/uL


(1.0-4.8) 1.6 x10^3/uL


(1.0-4.8)


 


Monocytes # (Auto)


  


  0.4 x10^3/uL


(0.0-1.1) 0.7 x10^3/uL


(0.0-1.1)


 


Eosinophils # (Auto)


  


  0.4 x10^3/uL


(0.0-0.7) 0.3 x10^3/uL


(0.0-0.7)


 


Basophils # (Auto)


  


  0.1 x10^3/uL


(0.0-0.2) 0.0 x10^3/uL


(0.0-0.2)


 


Sodium Level


  


  128 mmol/L


(136-145) 138 mmol/L


(136-145)


 


Potassium Level


  


  4.4 mmol/L


(3.5-5.1) 4.3 mmol/L


(3.5-5.1)


 


Chloride Level


  


  90 mmol/L


() 105 mmol/L


()


 


Carbon Dioxide Level


  


  23 mmol/L


(21-32) 23 mmol/L


(21-32)


 


Anion Gap  15 (6-14)  10 (6-14) 


 


Blood Urea Nitrogen  8 mg/dL (8-26)  9 mg/dL (8-26) 


 


Creatinine


  


  0.9 mg/dL


(0.7-1.3) 0.8 mg/dL


(0.7-1.3)


 


Estimated GFR


(Cockcroft-Gault) 


  86.7 


  99.3 


 


 


BUN/Creatinine Ratio  9 (6-20)  


 


Glucose Level


  


  98 mg/dL


(70-99) 104 mg/dL


(70-99)


 


Calcium Level


  


  8.4 mg/dL


(8.5-10.1) 8.1 mg/dL


(8.5-10.1)


 


Magnesium Level


  


  2.2 mg/dL


(1.8-2.4) 


 


 


Total Bilirubin


  


  0.4 mg/dL


(0.2-1.0) 


 


 


Aspartate Amino Transf


(AST/SGOT) 


  37 U/L (15-37) 


  


 


 


Alanine Aminotransferase


(ALT/SGPT) 


  28 U/L (16-63) 


  


 


 


Alkaline Phosphatase


  


  60 U/L


() 


 


 


Total Protein


  


  7.9 g/dL


(6.4-8.2) 


 


 


Albumin


  


  4.0 g/dL


(3.4-5.0) 


 


 


Albumin/Globulin Ratio  1.0 (1.0-1.7)  


 


Ethyl Alcohol Level


  


  363 mg/dL


(0-10) 


 








Medications





Current Medications


Multivitamins 10 ml/Thiamine HCl 100 mg/Folic Acid 1 mg/Sodium Chloride 1,011.2 

ml  @ 1,000 mls/ hr Q1H IV  Last administered on 12/4/17at 18:37;  Start 12/4/ 17 at 18:15;  Stop 12/4/17 at 18:37;  Status DC


Diazepam (Valium) 5 mg PRN Q5MIN  PRN IV COMM Last administered on 12/4/17at 21:

03;  Start 12/4/17 at 18:00;  Stop 12/5/17 at 01:43;  Status DC


Diazepam (Valium) 10 mg PRN Q5MIN  PRN IV COMM;  Start 12/4/17 at 18:00;  Stop 

12/5/17 at 01:43;  Status DC


Ondansetron HCl (Zofran) 4 mg PRN Q8HRS  PRN IV NAUSEA/VOMITING Last 

administered on 12/5/17at 08:05;  Start 12/4/17 at 20:15;  Stop 12/5/17 at 20:14


Sodium Chloride 1,000 ml @  125 mls/hr Q8H IV  Last administered on 12/5/17at 12

:12;  Start 12/4/17 at 20:15;  Stop 12/5/17 at 20:14


Acetaminophen (Tylenol) 650 mg PRN Q4HRS  PRN PO FEVER Last administered on 12/5 /17at 08:33;  Start 12/4/17 at 20:15;  Stop 12/5/17 at 20:14


Lorazepam (Ativan) 4 mg PRN Q1HR  PRN PO For CIWA 8-14;  Start 12/4/17 at 21:45


Lorazepam (Ativan) 8 mg PRN Q1HR  PRN PO For CIWA 15 or greater;  Start 12/4/17 

at 21:45


Haloperidol Lactate (Haldol) 5 mg PRN Q4HRS  PRN IVP Hallucinatns,Confusn,

Delirium;  Start 12/4/17 at 21:45


Diphenhydramine HCl (Benadryl) 25 mg PRN Q15MIN  PRN IVP EPS symptoms 2'Haldol 

admin;  Start 12/4/17 at 21:45


Clonidine HCl (Catapres) 0.1 mg PRN Q1HR  PRN PO SBP > 180 or DBP > 100, MRX3;  

Start 12/4/17 at 21:45


Lorazepam (Ativan) 4 mg PRN Q1HR  PRN IV For CIWA 15 or greater Last 

administered on 12/5/17at 09:49;  Start 12/4/17 at 21:45


Quetiapine Fumarate (SEROquel) 50 mg HS PO  Last administered on 12/5/17at 00:50

;  Start 12/5/17 at 00:45


Chlordiazepoxide (Librium) 25 mg PRN Q6HRS  PRN PO ANXIETY / AGITATION;  Start 

12/5/17 at 10:45


Acetaminophen/ Hydrocodone Bitart (Lortab 5/325) 1 tab PRN Q4HRS  PRN PO PAIN;  

Start 12/5/17 at 10:45





Active Scripts


Active


Reported


Ativan (Lorazepam) 1 Mg Tablet 1 Mg PO Q12HR PRN


Escitalopram Oxalate 20 Mg Tablet 20 Mg PO DAILY


Pantoprazole Sodium 40 Mg Tablet.dr 40 Mg PO DAILY


Seroquel (Quetiapine Fumarate) 25 Mg Tablet 50 Mg PO HS


Finasteride 5 Mg Tablet 5 Mg PO DAILY


Vitals/I & O





Vital Sign - Last 24 Hours








 12/4/17 12/4/17 12/4/17 12/4/17





 17:35 18:35 18:48 19:18


 


Temp 98.0   





 98.0   


 


Pulse 102 98 102 98


 


Resp 22 22 24 13


 


B/P (MAP) 146/89 (108) 131/79 (96) 138/86 (103) 129/78 (95)


 


Pulse Ox 95 94 98 95


 


O2 Delivery Room Air Room Air Room Air Room Air


 


    





    





 12/4/17 12/4/17 12/4/17 12/4/17





 19:48 20:18 21:06 21:18


 


Pulse 104 102 102 106


 


Resp 19 17 20 21


 


B/P (MAP) 121/70 (87) 123/76 (92) 124/69 (87) 134/82 (99)


 


Pulse Ox 96 97 98 92


 


O2 Delivery Room Air Room Air Room Air Room Air





 12/4/17 12/4/17 12/4/17 12/4/17





 21:48 22:00 22:00 22:15


 


Temp  97.7  





  97.7  


 


Pulse 104   110


 


Resp 18   29


 


B/P (MAP) 135/76 (95) 132/67 (88)  107/60 (76)


 


Pulse Ox 98   99


 


O2 Delivery Room Air  Room Air Room Air


 


    





    





 12/4/17 12/4/17 12/4/17 12/4/17





 22:30 22:45 23:00 23:30


 


Pulse 106 106 108 106


 


Resp 26 22 24 16


 


B/P (MAP) 118/67 (84) 117/67 (84) 129/83 (98) 114/61 (78)


 


Pulse Ox 99 98 93 93


 


O2 Delivery Room Air Room Air Room Air Room Air





 12/4/17 12/5/17 12/5/17 12/5/17





 23:59 00:00 01:00 02:00


 


Temp    97.9





    97.9


 


Pulse  106 103 107


 


Resp  14 12 18


 


B/P (MAP)  96/49 (65) 105/59 (74) 99/66 (77)


 


Pulse Ox  95 97 95


 


O2 Delivery Room Air Room Air Nasal Cannula Nasal Cannula


 


O2 Flow Rate   2.0 2.0


 


    





    





 12/5/17 12/5/17 12/5/17 12/5/17





 02:08 03:00 07:00 08:00


 


Temp  97.9 98.1 





  97.9 98.1 


 


Pulse  107 98 


 


Resp  18 20 


 


B/P (MAP)  99/66 (77) 138/89 (105) 


 


Pulse Ox  95 95 


 


O2 Delivery Room Air Nasal Cannula Nasal Cannula Room Air


 


O2 Flow Rate  2.0 2.0 














Intake and Output   


 


 12/4/17 12/4/17 12/5/17





 15:00 23:00 07:00


 


Intake Total   1850 ml


 


Output Total  1000 ml 1500 ml


 


Balance  -1000 ml 350 ml

















TARIQ ZAVALA MD Dec 5, 2017 12:18

## 2017-12-05 NOTE — ED.ADGEN
Past Medical History


Past Medical History:  Alcoholism, Anxiety, Depression, Other


Additional Past Medical Histor:  PULMONARY EMBOLUS 2014,ETOH & BENZO WITHDRAWAL


Past Surgical History:  Other


Additional Past Surgical Histo:  BILAT KNEE ARTHROSCOPY, bilat shoulder 

arthroscopy,NASAL SURG


Alcohol Use:  Heavy


Additional Information:  


PATIENT STATES, "I DON'T DRINK EVERY DAY, & IF I DO DRINK, I DRINK 10 BEERS A 


DAY."


Drug Use:  Benzodiazepine





Adult General


Chief Complaint


Chief Complaint:  WITHDRAWL





HPI


HPI





Patient is a 57  year old man, history of alcohol abuse with alcohol withdrawal

, who presents to the emergency department with complaint of alcohol withdrawal 

symptoms, requesting detox. Patient states "I can't do this anymore". He states 

that he last had a drink alcohol but 9 AM. He drinks about a 12 pack of beer 

daily. He states that he has been through detox previously, states lasted about 

2 years ago, he states that recent social stressors have caused him to drink 

more heavily. He denies any self injures behaviors, any suicidal or homicidal 

ideation. He states he is feeling slightly short of breath, states "twisted my 

anxiety". Patient states he is feeling very shaky, denies any chest pain, any 

weakness, numbness, tingling, recent injuries, states he's had several episodes 

of nausea and vomiting over the past several days, denies any nausea currently. 

Denies any focal weakness, numbness or tingling. Initial CIWA score of 7.





Review of Systems


Review of Systems


Constitutional:  Denies fever or chills. []Generalized malaise and "shakiness".


Eyes:  Denies change in visual acuity. []


HENT:  Denies nasal congestion or sore throat. [] 


Respiratory:  Denies cough, shortness of breath.


Cardiovascular:  Denies chest pain or edema. [] 


GI:  Denies abdominal pain, bloody stools or diarrhea. [] Complaining of 

occasional nausea and vomiting.


:  Denies dysuria. [] 


Musculoskeletal:  Denies back pain or joint pain. [] 


Integument:  Denies rash. [] 


Neurologic:  Denies headache, focal weakness or sensory changes. []"Shakiness". 


Endocrine:  Denies polyuria or polydipsia. [] 


Lymphatic:  Denies swollen glands. [] 


Psychiatric: Complaining of anxiety.





Current Medications


Current Medications





Current Medications








 Medications


  (Trade)  Dose


 Ordered  Sig/Barbie  Start Time


 Stop Time Status Last Admin


Dose Admin


 


 Diazepam


  (Valium)  10 mg  PRN Q5MIN  PRN  17 18:00


     


 


 


 Multivitamins 10


 ml/Thiamine HCl


 100 mg/Folic Acid


 1 mg/Sodium


 Chloride  1,011.2 ml


  @ 1,000 mls/


 hr  Q1H  17 18:15


 17 18:37 DC 17 18:37


1,000 MLS/HR











Allergies


Allergies





Allergies








Coded Allergies Type Severity Reaction Last Updated Verified


 


  Penicillins Allergy Intermediate Rash 16 Yes











Physical Exam


Physical Exam





Constitutional: Well developed, well nourished, no acute distress, non-toxic 

appearance. []


HENT: Normocephalic, atraumatic, bilateral external ears normal, oropharynx 

moist, no oral exudates, nose normal. []


Eyes: PERRLA, EOMI, conjunctiva normal, no discharge. [] 


Neck: Normal range of motion, no tenderness, supple, no stridor. [] 


Cardiovascular:Heart rate regular rhythm, no murmur, S1, S2, no rubs or 

gallops. []


Lungs & Thorax:  Bilateral breath sounds clear to auscultation, no wheezing, 

rhonchi, rales. No chest wall crepitus or tenderness. []


Abdomen: Bowel sounds normal, soft, no tenderness, no rebound, rigidity, no 

guarding, no masses, no pulsatile masses. [] 


Skin: Warm, dry, no erythema, no rash. [] 


Back: No tenderness, no CVA tenderness. [] 


Extremities: No tenderness, no cyanosis, no clubbing, ROM intact, no edema. 

Negative Homans sign.[] 


Neurologic: Alert and oriented X 3, normal motor function, normal sensory 

function, no focal deficits noted. []


Psychologic: Patient with rapid speech, judgment appears normal, is anxious in 

appearance.





Current Patient Data


Vital Signs





 Vital Signs








  Date Time  Temp Pulse Resp B/P (MAP) Pulse Ox O2 Delivery O2 Flow Rate FiO2


 


17 19:48  104 19 121/70 (87) 96 Room Air  


 


17 17:35 98.0       





 98.0       








Lab Values





 Laboratory Tests








Test


  17


18:15 17


18:25


 


Urine Collection Type Void   


 


Urine Color Yellow   


 


Urine Clarity Clear   


 


Urine pH 7.0   


 


Urine Specific Gravity <=1.005   


 


Urine Protein


  Negative mg/dL


(NEG-TRACE) 


 


 


Urine Glucose (UA)


  Negative mg/dL


(NEG) 


 


 


Urine Ketones (Stick)


  Negative mg/dL


(NEG) 


 


 


Urine Blood


  Negative (NEG)


  


 


 


Urine Nitrite


  Negative (NEG)


  


 


 


Urine Bilirubin


  Negative (NEG)


  


 


 


Urine Urobilinogen Dipstick


  0.2 mg/dL (0.2


mg/dL) 


 


 


Urine Leukocyte Esterase


  Negative (NEG)


  


 


 


Urine RBC 0 /HPF (0-2)   


 


Urine WBC 0 /HPF (0-4)   


 


Urine Squamous Epithelial


Cells None /LPF  


  


 


 


Urine Bacteria


  0 /HPF (0-FEW)


  


 


 


Urine Opiates Screen Neg (NEG)   


 


Urine Methadone Screen Neg (NEG)   


 


Urine Barbiturates Neg (NEG)   


 


Urine Phencyclidine Screen Neg (NEG)   


 


Urine


Amphetamine/Methamphetamine Neg (NEG)  


  


 


 


Urine Benzodiazepines Screen Neg (NEG)   


 


Urine Cocaine Screen Neg (NEG)   


 


Urine Cannabinoids Screen Neg (NEG)   


 


Urine Ethyl Alcohol Pos (NEG)   


 


White Blood Count


  


  9.0 x10^3/uL


(4.0-11.0)


 


Red Blood Count


  


  4.52 x10^6/uL


(4.30-5.70)


 


Hemoglobin


  


  14.4 g/dL


(13.0-17.5)


 


Hematocrit


  


  43.1 %


(39.0-53.0)


 


Mean Corpuscular Volume


  


  95 fL ()


 


 


Mean Corpuscular Hemoglobin  32 pg (25-35)  


 


Mean Corpuscular Hemoglobin


Concent 


  34 g/dL


(31-37)


 


Red Cell Distribution Width


  


  14.6 %


(11.5-14.5)  H


 


Platelet Count


  


  241 x10^3/uL


(140-400)


 


Neutrophils (%) (Auto)  70 % (31-73)  


 


Lymphocytes (%) (Auto)  20 % (24-48)  L


 


Monocytes (%) (Auto)  5 % (0-9)  


 


Eosinophils (%) (Auto)  5 % (0-3)  H


 


Basophils (%) (Auto)  1 % (0-3)  


 


Neutrophils # (Auto)


  


  6.3 x10^3uL


(1.8-7.7)


 


Lymphocytes # (Auto)


  


  1.8 x10^3/uL


(1.0-4.8)


 


Monocytes # (Auto)


  


  0.4 x10^3/uL


(0.0-1.1)


 


Eosinophils # (Auto)


  


  0.4 x10^3/uL


(0.0-0.7)


 


Basophils # (Auto)


  


  0.1 x10^3/uL


(0.0-0.2)


 


Sodium Level


  


  128 mmol/L


(136-145)  L


 


Potassium Level


  


  4.4 mmol/L


(3.5-5.1)


 


Chloride Level


  


  90 mmol/L


()  L


 


Carbon Dioxide Level


  


  23 mmol/L


(21-32)


 


Anion Gap  15 (6-14)  H


 


Blood Urea Nitrogen


  


  8 mg/dL (8-26)


 


 


Creatinine


  


  0.9 mg/dL


(0.7-1.3)


 


Estimated GFR


(Cockcroft-Gault) 


  86.7  


 


 


BUN/Creatinine Ratio  9 (6-20)  


 


Glucose Level


  


  98 mg/dL


(70-99)


 


Calcium Level


  


  8.4 mg/dL


(8.5-10.1)  L


 


Magnesium Level


  


  2.2 mg/dL


(1.8-2.4)


 


Total Bilirubin


  


  0.4 mg/dL


(0.2-1.0)


 


Aspartate Amino Transferase


(AST) 


  37 U/L (15-37)


 


 


Alanine Aminotransferase (ALT)


  


  28 U/L (16-63)


 


 


Alkaline Phosphatase


  


  60 U/L


()


 


Total Protein


  


  7.9 g/dL


(6.4-8.2)


 


Albumin


  


  4.0 g/dL


(3.4-5.0)


 


Albumin/Globulin Ratio  1.0 (1.0-1.7)  


 


Ethyl Alcohol Level


  


  363 mg/dL


(0-10)  H





 Laboratory Tests


17 18:25








 Laboratory Tests


17 18:25














EKG


EKG


EC: Sinus rhythm, heart rate 97 bpm, upright axis, QTC of 446, , 

QRS of 98, patient with contour normality is noted in the anterior lateral leads

, with 1 mm ST elevation noted in V2, through V6. Abnormal ECG, does not meet 

STEMI criteria. As interpreted by me.





Radiology/Procedures


Radiology/Procedures


Chest x-ray: One view: Patient with hyperinflation noted, limited evaluation 

due to imaging positioning, however no infiltrates, effusions, pneumothorax, 

soft tissue or bone abdomen abnormalities identified, patient with normal 

cardiac silhouette. As interpreted by me.[]





Course & Med Decision Making


Course & Med Decision Making


Pertinent Labs and Imaging studies reviewed. (See chart for details)





Patient initiated on alcohol withdrawal protocol based on his history and 

complaints. Noted to be tachycardic upon arrival to the emergency department, 

heart rate in the low 100s to 1 teens, blood pressures are within normal 

limits. Patient received Valium per the protocol, laboratory studies reveal the 

patient has a blood level of 363, is clinically sober, sodium is 128, patient 

with banana bag infusing. I did discuss findings as above with Dr. Avilez of 

internal medicine, patient accepted to his service as a full admission to the 

ICU for initiation of alcohol withdrawal protocol and monitoring. is bridge 

orders entered, including consultation for the psychiatric assessment team for 

detox placement. Patient transferred to the ICU without issue.





Dragon Disclaimer


Dragon Disclaimer


This electronic medical record was generated, in whole or in part, using a 

voice recognition dictation system.





Departure


Impression:  


 Primary Impression:  


 Alcohol withdrawal


 Additional Impression:  


 Anxiety


Disposition:  09 ADMITTED AS INPATIENT


Admitting Physician:  Melany Avilez


Condition:  IMPROVED





Problem Qualifiers











KIANA SEWELL DO Dec 5, 2017 01:01

## 2017-12-05 NOTE — RAD
Indication: EtOH withdrawal.



Time of exam 1859 hours.



Correlation is made with prior chest from 3/18/2016.



FINDINGS: The heart size is normal. The lungs are clear. No pleural effusion

or pneumothorax is identified.  The pulmonary vascularity is normal.



IMPRESSION: No acute abnormality detected.

## 2017-12-06 VITALS — DIASTOLIC BLOOD PRESSURE: 85 MMHG | SYSTOLIC BLOOD PRESSURE: 128 MMHG

## 2017-12-06 VITALS — SYSTOLIC BLOOD PRESSURE: 128 MMHG | DIASTOLIC BLOOD PRESSURE: 86 MMHG

## 2017-12-06 VITALS — DIASTOLIC BLOOD PRESSURE: 84 MMHG | SYSTOLIC BLOOD PRESSURE: 125 MMHG

## 2017-12-06 NOTE — PDOC
PROGRESS NOTES


Chief Complaint


Chief Complaint


Alcohol intoxication, with levels greater than 300 on admission





History of Present Illness


History of Present Illness


Pt Seen at bedside, sitting up and ready to go, concerned about tremors on DC 

and requested prescription for Ativan taper, does not like the Librium. 


Pt is being DC today, home.


Recommended AA to assist ETOH dependence


DW RN 











Vitals


Vitals





Vital Signs








  Date Time  Temp Pulse Resp B/P (MAP) Pulse Ox O2 Delivery O2 Flow Rate FiO2


 


12/6/17 11:19 97.6 88 18 128/86 (100) 99 Room Air  





 97.6       


 


12/5/17 16:00       2.0 











Physical Exam


General:  Alert, Oriented X3, Cooperative, No acute distress


Heart:  Regular rate, No murmurs


Lungs:  Clear


Abdomen:  Normal bowel sounds, Soft


Extremities:  No clubbing, No edema, Normal pulses


Skin:  No rashes, No significant lesion





Review of Systems


Review of Systems


General: No Fever, Chills, Night Sweats


CV: No Chest Pain, Palpitations





Assessment and Plan


Assessmemt and Plan


Assessment;


Alcohol withdrawal


Alcohol Intoxication


Anxiety





Plan:


Ordered Ativan Taper


Continue Etoh WD procedures


Continue Home Meds


Continue PT/OT


Recheck Labs


DC probable today- home  





Problems:  





Comment


Review of Relevant


I have reviewed the following items ayush (where applicable) has been applied.


Labs





Laboratory Tests








Test


  12/4/17


18:15 12/4/17


18:25 12/4/17


23:00 12/5/17


06:00


 


Urine Collection Type Void    


 


Urine Color Yellow    


 


Urine Clarity Clear    


 


Urine pH 7.0    


 


Urine Specific Gravity <=1.005    


 


Urine Protein


  Negative mg/dL


(NEG-TRACE) 


  


  


 


 


Urine Glucose (UA)


  Negative mg/dL


(NEG) 


  


  


 


 


Urine Ketones (Stick)


  Negative mg/dL


(NEG) 


  


  


 


 


Urine Blood Negative (NEG)    


 


Urine Nitrite Negative (NEG)    


 


Urine Bilirubin Negative (NEG)    


 


Urine Urobilinogen Dipstick


  0.2 mg/dL (0.2


mg/dL) 


  


  


 


 


Urine Leukocyte Esterase Negative (NEG)    


 


Urine RBC 0 /HPF (0-2)    


 


Urine WBC 0 /HPF (0-4)    


 


Urine Squamous Epithelial


Cells None /LPF 


  


  


  


 


 


Urine Bacteria 0 /HPF (0-FEW)    


 


Urine Opiates Screen Neg (NEG)    


 


Urine Methadone Screen Neg (NEG)    


 


Urine Barbiturates Neg (NEG)    


 


Urine Phencyclidine Screen Neg (NEG)    


 


Urine


Amphetamine/Methamphetamine Neg (NEG) 


  


  


  


 


 


Urine Benzodiazepines Screen Neg (NEG)    


 


Urine Cocaine Screen Neg (NEG)    


 


Urine Cannabinoids Screen Neg (NEG)    


 


Urine Ethyl Alcohol Pos (NEG)    


 


White Blood Count


  


  9.0 x10^3/uL


(4.0-11.0) 


  5.1 x10^3/uL


(4.0-11.0)


 


Red Blood Count


  


  4.52 x10^6/uL


(4.30-5.70) 


  3.88 x10^6/uL


(4.30-5.70)


 


Hemoglobin


  


  14.4 g/dL


(13.0-17.5) 


  12.4 g/dL


(13.0-17.5)


 


Hematocrit


  


  43.1 %


(39.0-53.0) 


  37.7 %


(39.0-53.0)


 


Mean Corpuscular Volume  95 fL ()   97 fL () 


 


Mean Corpuscular Hemoglobin  32 pg (25-35)   32 pg (25-35) 


 


Mean Corpuscular Hemoglobin


Concent 


  34 g/dL


(31-37) 


  33 g/dL


(31-37)


 


Red Cell Distribution Width


  


  14.6 %


(11.5-14.5) 


  14.7 %


(11.5-14.5)


 


Platelet Count


  


  241 x10^3/uL


(140-400) 


  203 x10^3/uL


(140-400)


 


Neutrophils (%) (Auto)  70 % (31-73)   50 % (31-73) 


 


Lymphocytes (%) (Auto)  20 % (24-48)   31 % (24-48) 


 


Monocytes (%) (Auto)  5 % (0-9)   13 % (0-9) 


 


Eosinophils (%) (Auto)  5 % (0-3)   6 % (0-3) 


 


Basophils (%) (Auto)  1 % (0-3)   1 % (0-3) 


 


Neutrophils # (Auto)


  


  6.3 x10^3uL


(1.8-7.7) 


  2.5 x10^3uL


(1.8-7.7)


 


Lymphocytes # (Auto)


  


  1.8 x10^3/uL


(1.0-4.8) 


  1.6 x10^3/uL


(1.0-4.8)


 


Monocytes # (Auto)


  


  0.4 x10^3/uL


(0.0-1.1) 


  0.7 x10^3/uL


(0.0-1.1)


 


Eosinophils # (Auto)


  


  0.4 x10^3/uL


(0.0-0.7) 


  0.3 x10^3/uL


(0.0-0.7)


 


Basophils # (Auto)


  


  0.1 x10^3/uL


(0.0-0.2) 


  0.0 x10^3/uL


(0.0-0.2)


 


Sodium Level


  


  128 mmol/L


(136-145) 


  138 mmol/L


(136-145)


 


Potassium Level


  


  4.4 mmol/L


(3.5-5.1) 


  4.3 mmol/L


(3.5-5.1)


 


Chloride Level


  


  90 mmol/L


() 


  105 mmol/L


()


 


Carbon Dioxide Level


  


  23 mmol/L


(21-32) 


  23 mmol/L


(21-32)


 


Anion Gap  15 (6-14)   10 (6-14) 


 


Blood Urea Nitrogen  8 mg/dL (8-26)   9 mg/dL (8-26) 


 


Creatinine


  


  0.9 mg/dL


(0.7-1.3) 


  0.8 mg/dL


(0.7-1.3)


 


Estimated GFR


(Cockcroft-Gault) 


  86.7 


  


  99.3 


 


 


BUN/Creatinine Ratio  9 (6-20)   


 


Glucose Level


  


  98 mg/dL


(70-99) 


  104 mg/dL


(70-99)


 


Calcium Level


  


  8.4 mg/dL


(8.5-10.1) 


  8.1 mg/dL


(8.5-10.1)


 


Magnesium Level


  


  2.2 mg/dL


(1.8-2.4) 


  


 


 


Total Bilirubin


  


  0.4 mg/dL


(0.2-1.0) 


  


 


 


Aspartate Amino Transf


(AST/SGOT) 


  37 U/L (15-37) 


  


  


 


 


Alanine Aminotransferase


(ALT/SGPT) 


  28 U/L (16-63) 


  


  


 


 


Alkaline Phosphatase


  


  60 U/L


() 


  


 


 


Total Protein


  


  7.9 g/dL


(6.4-8.2) 


  


 


 


Albumin


  


  4.0 g/dL


(3.4-5.0) 


  


 


 


Albumin/Globulin Ratio  1.0 (1.0-1.7)   


 


Ethyl Alcohol Level


  


  363 mg/dL


(0-10) 


  


 


 


Nasal Screen MRSA (PCR)


  


  


  Negative


(Negative) 


 








Medications





Current Medications


Multivitamins 10 ml/Thiamine HCl 100 mg/Folic Acid 1 mg/Sodium Chloride 1,011.2 

ml  @ 1,000 mls/ hr Q1H IV  Last administered on 12/4/17at 18:37;  Start 12/4/ 17 at 18:15;  Stop 12/4/17 at 18:37;  Status DC


Diazepam (Valium) 5 mg PRN Q5MIN  PRN IV COMM Last administered on 12/4/17at 21:

03;  Start 12/4/17 at 18:00;  Stop 12/5/17 at 01:43;  Status DC


Diazepam (Valium) 10 mg PRN Q5MIN  PRN IV COMM;  Start 12/4/17 at 18:00;  Stop 

12/5/17 at 01:43;  Status DC


Ondansetron HCl (Zofran) 4 mg PRN Q8HRS  PRN IV NAUSEA/VOMITING Last 

administered on 12/5/17at 14:55;  Start 12/4/17 at 20:15;  Stop 12/5/17 at 20:14

;  Status DC


Sodium Chloride 1,000 ml @  125 mls/hr Q8H IV  Last administered on 12/5/17at 12

:12;  Start 12/4/17 at 20:15;  Stop 12/5/17 at 20:14;  Status DC


Acetaminophen (Tylenol) 650 mg PRN Q4HRS  PRN PO FEVER Last administered on 12/5 /17at 16:54;  Start 12/4/17 at 20:15;  Stop 12/5/17 at 20:14;  Status DC


Lorazepam (Ativan) 4 mg PRN Q1HR  PRN PO For CIWA 8-14 Last administered on 12/6 /17at 11:29;  Start 12/4/17 at 21:45;  Stop 12/6/17 at 14:33;  Status DC


Lorazepam (Ativan) 8 mg PRN Q1HR  PRN PO For CIWA 15 or greater Last 

administered on 12/5/17at 20:51;  Start 12/4/17 at 21:45;  Stop 12/6/17 at 14:33

;  Status DC


Haloperidol Lactate (Haldol) 5 mg PRN Q4HRS  PRN IVP Hallucinatns,Confusn,

Delirium;  Start 12/4/17 at 21:45;  Stop 12/6/17 at 14:33;  Status DC


Diphenhydramine HCl (Benadryl) 25 mg PRN Q15MIN  PRN IVP EPS symptoms 2'Haldol 

admin;  Start 12/4/17 at 21:45;  Stop 12/6/17 at 14:33;  Status DC


Clonidine HCl (Catapres) 0.1 mg PRN Q1HR  PRN PO SBP > 180 or DBP > 100, MRX3;  

Start 12/4/17 at 21:45;  Stop 12/6/17 at 14:33;  Status DC


Lorazepam (Ativan) 4 mg PRN Q1HR  PRN IV For CIWA 15 or greater Last 

administered on 12/5/17at 14:53;  Start 12/4/17 at 21:45;  Stop 12/6/17 at 14:33

;  Status DC


Quetiapine Fumarate (SEROquel) 50 mg HS PO  Last administered on 12/5/17at 23:00

;  Start 12/5/17 at 00:45;  Stop 12/6/17 at 14:33;  Status DC


Chlordiazepoxide (Librium) 25 mg PRN Q6HRS  PRN PO ANXIETY / AGITATION Last 

administered on 12/5/17at 18:55;  Start 12/5/17 at 10:45;  Stop 12/6/17 at 14:33

;  Status DC


Acetaminophen/ Hydrocodone Bitart (Lortab 5/325) 1 tab PRN Q4HRS  PRN PO PAIN 

Last administered on 12/6/17at 08:24;  Start 12/5/17 at 10:45;  Stop 12/6/17 at 

14:33;  Status DC


Artificial Tears (Artificial Tears) 1 drop PRN Q15MIN  PRN OU DRY EYE;  Start 12 /5/17 at 23:45;  Stop 12/6/17 at 14:33;  Status DC





Active Scripts


Active


Reported


Ativan (Lorazepam) 1 Mg Tablet 1 Mg PO Q12HR PRN


Escitalopram Oxalate 20 Mg Tablet 20 Mg PO DAILY


Pantoprazole Sodium 40 Mg Tablet.dr 40 Mg PO DAILY


Seroquel (Quetiapine Fumarate) 25 Mg Tablet 50 Mg PO HS


Finasteride 5 Mg Tablet 5 Mg PO DAILY


Vitals/I & O





Vital Sign - Last 24 Hours








 12/5/17 12/5/17 12/5/17 12/5/17





 16:00 19:00 20:00 23:00


 


Temp 98.1 97.7  97.7





 98.1 97.7  97.7


 


Pulse 104 100  101


 


Resp 19 20  20


 


B/P (MAP) 144/91 (108) 140/94 (109)  142/90 (107)


 


Pulse Ox 94 93  95


 


O2 Delivery Nasal Cannula Room Air Room Air Room Air


 


O2 Flow Rate 2.0   


 


    





    





 12/6/17 12/6/17 12/6/17 12/6/17





 03:02 07:00 08:00 08:24


 


Temp 97.5   





 97.5   


 


Pulse 89 85  


 


Resp 20 18  


 


B/P (MAP) 128/85 (99) 125/84 (98)  


 


Pulse Ox 96 97  


 


O2 Delivery Room Air Room Air Room Air Room Air


 


    





    





 12/6/17 12/6/17  





 09:39 11:19  


 


Temp  97.6  





  97.6  


 


Pulse  88  


 


Resp  18  


 


B/P (MAP)  128/86 (100)  


 


Pulse Ox  99  


 


O2 Delivery Room Air Room Air  














Intake and Output   


 


 12/5/17 12/5/17 12/6/17





 15:00 23:00 07:00


 


Intake Total 660 ml 120 ml 


 


Output Total 200 ml 300 ml 


 


Balance 460 ml -180 ml 

















BERTA BURDEN III DO Dec 6, 2017 15:16

## 2018-03-11 ENCOUNTER — HOSPITAL ENCOUNTER (EMERGENCY)
Dept: HOSPITAL 61 - ER | Age: 58
Discharge: LEFT BEFORE DISCHARGE | End: 2018-03-11
Payer: COMMERCIAL

## 2018-03-11 DIAGNOSIS — F10.10: Primary | ICD-10-CM

## 2018-03-11 DIAGNOSIS — Z88.0: ICD-10-CM

## 2018-03-11 DIAGNOSIS — I10: ICD-10-CM

## 2018-03-11 DIAGNOSIS — Z86.711: ICD-10-CM

## 2018-03-11 PROCEDURE — 99281 EMR DPT VST MAYX REQ PHY/QHP: CPT

## 2018-03-13 ENCOUNTER — HOSPITAL ENCOUNTER (EMERGENCY)
Dept: HOSPITAL 61 - ER | Age: 58
Discharge: HOME | End: 2018-03-13
Payer: COMMERCIAL

## 2018-03-13 DIAGNOSIS — Z88.0: ICD-10-CM

## 2018-03-13 DIAGNOSIS — F10.10: Primary | ICD-10-CM

## 2018-03-13 DIAGNOSIS — I10: ICD-10-CM

## 2018-03-13 DIAGNOSIS — Z86.711: ICD-10-CM

## 2018-03-13 DIAGNOSIS — E86.0: ICD-10-CM

## 2018-03-13 DIAGNOSIS — R74.8: ICD-10-CM

## 2018-03-13 LAB
ADD MAN DIFF?: NO
ALBUMIN SERPL-MCNC: 3.2 G/DL (ref 3.4–5)
ALBUMIN/GLOB SERPL: 0.7 {RATIO} (ref 1–1.7)
ALP SERPL-CCNC: 175 U/L (ref 46–116)
ALT (SGPT): 297 U/L (ref 16–63)
ANION GAP SERPL CALC-SCNC: 14 MMOL/L (ref 6–14)
AST SERPL-CCNC: 321 U/L (ref 15–37)
BASO #: 0.1 X10^3/UL (ref 0–0.2)
BASO %: 1 % (ref 0–3)
BLOOD UREA NITROGEN: 5 MG/DL (ref 8–26)
BUN/CREAT SERPL: 6 (ref 6–20)
CALCIUM: 8.9 MG/DL (ref 8.5–10.1)
CHLORIDE: 100 MMOL/L (ref 98–107)
CO2 SERPL-SCNC: 25 MMOL/L (ref 21–32)
CREAT SERPL-MCNC: 0.8 MG/DL (ref 0.7–1.3)
EOS #: 0.4 X10^3/UL (ref 0–0.7)
EOS %: 7 % (ref 0–3)
GFR SERPLBLD BASED ON 1.73 SQ M-ARVRAT: 99.3 ML/MIN
GLOBULIN SER-MCNC: 4.5 G/DL (ref 2.2–3.8)
GLUCOSE SERPL-MCNC: 105 MG/DL (ref 70–99)
HCG SERPL-ACNC: 5.8 X10^3/UL (ref 4–11)
HEMATOCRIT: 37.5 % (ref 39–53)
HEMOGLOBIN: 12.6 G/DL (ref 13–17.5)
LYMPH #: 1.3 X10^3/UL (ref 1–4.8)
LYMPH %: 22 % (ref 24–48)
MAGNESIUM: 2 MG/DL (ref 1.8–2.4)
MEAN CORPUSCULAR HEMOGLOBIN: 33 PG (ref 25–35)
MEAN CORPUSCULAR HGB CONC: 34 G/DL (ref 31–37)
MEAN CORPUSCULAR VOLUME: 98 FL (ref 79–100)
MONO #: 0.9 X10^3/UL (ref 0–1.1)
MONO %: 16 % (ref 0–9)
NEUT #: 3.1 X10^3UL (ref 1.8–7.7)
NEUT %: 53 % (ref 31–73)
PLATELET COUNT: 232 X10^3/UL (ref 140–400)
POTASSIUM SERPL-SCNC: 3.4 MMOL/L (ref 3.5–5.1)
RED BLOOD COUNT: 3.84 X10^6/UL (ref 4.3–5.7)
RED CELL DISTRIBUTION WIDTH: 17 % (ref 11.5–14.5)
SODIUM: 139 MMOL/L (ref 136–145)
TOTAL BILIRUBIN: 0.6 MG/DL (ref 0.2–1)
TOTAL PROTEIN: 7.7 G/DL (ref 6.4–8.2)

## 2018-03-13 PROCEDURE — 36415 COLL VENOUS BLD VENIPUNCTURE: CPT

## 2018-03-13 PROCEDURE — 83735 ASSAY OF MAGNESIUM: CPT

## 2018-03-13 PROCEDURE — 99284 EMERGENCY DEPT VISIT MOD MDM: CPT

## 2018-03-13 PROCEDURE — 85025 COMPLETE CBC W/AUTO DIFF WBC: CPT

## 2018-03-13 PROCEDURE — 80053 COMPREHEN METABOLIC PANEL: CPT
